# Patient Record
Sex: FEMALE | Race: WHITE | NOT HISPANIC OR LATINO | Employment: PART TIME | URBAN - METROPOLITAN AREA
[De-identification: names, ages, dates, MRNs, and addresses within clinical notes are randomized per-mention and may not be internally consistent; named-entity substitution may affect disease eponyms.]

---

## 2017-02-09 ENCOUNTER — APPOINTMENT (OUTPATIENT)
Dept: LAB | Facility: MEDICAL CENTER | Age: 22
End: 2017-02-09

## 2017-02-09 ENCOUNTER — TRANSCRIBE ORDERS (OUTPATIENT)
Dept: URGENT CARE | Facility: MEDICAL CENTER | Age: 22
End: 2017-02-09

## 2017-02-09 ENCOUNTER — APPOINTMENT (OUTPATIENT)
Dept: URGENT CARE | Facility: MEDICAL CENTER | Age: 22
End: 2017-02-09

## 2017-02-09 DIAGNOSIS — Z02.1 PHYSICAL EXAM, PRE-EMPLOYMENT: ICD-10-CM

## 2017-02-09 DIAGNOSIS — Z02.1 PHYSICAL EXAM, PRE-EMPLOYMENT: Primary | ICD-10-CM

## 2017-02-09 LAB — RUBV IGG SERPL IA-ACNC: >175 IU/ML

## 2017-02-09 PROCEDURE — 86765 RUBEOLA ANTIBODY: CPT

## 2017-02-09 PROCEDURE — 36415 COLL VENOUS BLD VENIPUNCTURE: CPT

## 2017-02-09 PROCEDURE — 86480 TB TEST CELL IMMUN MEASURE: CPT

## 2017-02-09 PROCEDURE — 86762 RUBELLA ANTIBODY: CPT

## 2017-02-09 PROCEDURE — 86787 VARICELLA-ZOSTER ANTIBODY: CPT

## 2017-02-09 PROCEDURE — 86735 MUMPS ANTIBODY: CPT

## 2017-02-11 LAB
ANNOTATION COMMENT IMP: NORMAL
GAMMA INTERFERON BACKGROUND BLD IA-ACNC: 0.05 IU/ML
M TB IFN-G BLD-IMP: NEGATIVE
M TB IFN-G CD4+ BCKGRND COR BLD-ACNC: <0.01 IU/ML
M TB IFN-G CD4+ T-CELLS BLD-ACNC: 0.04 IU/ML
MITOGEN IGNF BLD-ACNC: >10 IU/ML
QUANTIFERON-TB GOLD IN TUBE: NORMAL
SERVICE CMNT-IMP: NORMAL

## 2017-02-14 LAB
MEV IGG SER QL: ABNORMAL
MUV IGG SER QL: NORMAL
VZV IGG SER IA-ACNC: ABNORMAL

## 2018-02-14 ENCOUNTER — OFFICE VISIT (OUTPATIENT)
Dept: INTERNAL MEDICINE CLINIC | Facility: CLINIC | Age: 23
End: 2018-02-14
Payer: OTHER GOVERNMENT

## 2018-02-14 VITALS
WEIGHT: 127.8 LBS | DIASTOLIC BLOOD PRESSURE: 78 MMHG | HEIGHT: 64 IN | SYSTOLIC BLOOD PRESSURE: 120 MMHG | HEART RATE: 118 BPM | BODY MASS INDEX: 21.82 KG/M2

## 2018-02-14 DIAGNOSIS — G43.829 MENSTRUAL MIGRAINE WITHOUT STATUS MIGRAINOSUS, NOT INTRACTABLE: Primary | ICD-10-CM

## 2018-02-14 DIAGNOSIS — F41.9 ANXIETY: ICD-10-CM

## 2018-02-14 DIAGNOSIS — N94.6 DYSMENORRHEA: ICD-10-CM

## 2018-02-14 PROCEDURE — 99204 OFFICE O/P NEW MOD 45 MIN: CPT | Performed by: INTERNAL MEDICINE

## 2018-02-14 RX ORDER — LEVONORGESTREL AND ETHINYL ESTRADIOL 0.1-0.02MG
1 KIT ORAL DAILY
Refills: 3 | COMMUNITY
Start: 2017-11-15 | End: 2018-10-17 | Stop reason: SDUPTHER

## 2018-02-14 RX ORDER — NAPROXEN 500 MG/1
500 TABLET ORAL 2 TIMES DAILY WITH MEALS
Qty: 30 TABLET | Refills: 0 | Status: SHIPPED | OUTPATIENT
Start: 2018-02-14 | End: 2020-02-20 | Stop reason: ALTCHOICE

## 2018-02-14 RX ORDER — LEVONORGESTREL AND ETHINYL ESTRADIOL 0.1-0.02MG
1 KIT ORAL
COMMUNITY
Start: 2017-09-01 | End: 2018-02-14 | Stop reason: SDUPTHER

## 2018-02-14 NOTE — PROGRESS NOTES
Assessment/Plan:    No problem-specific Assessment & Plan notes found for this encounter  Diagnoses and all orders for this visit:    Menstrual migraine without status migrainosus, not intractable  -     CBC and differential; Future  -     TSH, 3rd generation with T4 reflex  -     Basic metabolic panel; Future  -     Lipid panel  -     naproxen (NAPROSYN) 500 mg tablet; Take 1 tablet (500 mg total) by mouth 2 (two) times a day with meals    Dysmenorrhea    Anxiety    Other orders  -     SRONYX 0 1-20 MG-MCG per tablet; Take 1 tablet by mouth daily  -     Discontinue: levonorgestrel-ethinyl estradiol (AVIANE,ALESSE,LESSINA) 0 1-20 MG-MCG per tablet; Take 1 tablet by mouth      Azar Vieira was seen and examined in the office today  She does get tearful on exam when discussing her   We discussed possibly seeing a therapist and fortunately does have a good support system with her mom  In terms of her migraines, I would continue with NSAID use or Excedrin  Hold off on daily preventative medication for now  Dysmenorrhea has improved  She was given blood work to complete  Subjective:      Patient ID: Kimberley Knutson is a 26 y o  female  Azar Vieira is a pleasant woman that is here today to establish care  Medical history was reviewed  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  Other history is significant for migraine headaches (see below) as well has dysmenorrhea which is much improved after being on birth control  In terms of her migraines, she reports episodes once a week and describes them left sided  Denies known triggers  She reports Excedrin does give relief  She reports increased stressors recently with her  being deployed 3 months ago  She does report a good support system with her mom  Migraine    This is a chronic problem  The current episode started more than 1 year ago  The problem occurs intermittently   The problem has been gradually worsening  The pain is located in the left unilateral and temporal region  The quality of the pain is described as sharp  Associated symptoms include photophobia  Pertinent negatives include no abdominal pain, blurred vision, coughing, dizziness, ear pain, eye pain, facial sweating, fever, nausea, phonophobia, seizures, sinus pressure, sore throat, swollen glands, visual change or vomiting  Nothing aggravates the symptoms  She has tried NSAIDs for the symptoms  Her past medical history is significant for migraine headaches and migraines in the family  There is no history of hypertension, sinus disease or TMJ  The following portions of the patient's history were reviewed and updated as appropriate: past medical history and problem list     Review of Systems   Constitutional: Negative for activity change, fatigue and fever  HENT: Negative for ear pain, sinus pressure and sore throat  Eyes: Positive for photophobia  Negative for blurred vision and pain  Respiratory: Negative for cough, chest tightness and wheezing  Hard to get a good breath of air- past 6 months  Some correlation with anxiety   Cardiovascular: Negative for chest pain, palpitations and leg swelling  Gastrointestinal: Negative for abdominal pain, constipation, diarrhea, nausea and vomiting  Genitourinary: Negative  Musculoskeletal: Negative for arthralgias  Skin: Negative  Neurological: Positive for headaches  Negative for dizziness, seizures and light-headedness  Psychiatric/Behavioral: Negative for dysphoric mood  The patient is nervous/anxious  Objective:    Vitals:    02/14/18 1054   BP: 120/78   Pulse: (!) 118        Physical Exam   Constitutional: She is oriented to person, place, and time  She appears well-developed and well-nourished  No distress  HENT:   Head: Normocephalic and atraumatic     Right Ear: External ear normal    Left Ear: External ear normal    Mouth/Throat: Oropharynx is clear and moist    Eyes: Conjunctivae are normal    Neck: Normal range of motion  Neck supple  No thyromegaly present  Cardiovascular: Normal rate, regular rhythm and normal heart sounds  Pulmonary/Chest: Effort normal and breath sounds normal  No respiratory distress  She has no wheezes  Abdominal: Soft  Bowel sounds are normal  There is no tenderness  Neurological: She is alert and oriented to person, place, and time  No cranial nerve deficit  Skin: Skin is warm and dry  She is not diaphoretic  Psychiatric: She has a normal mood and affect   Her speech is normal and behavior is normal  Judgment and thought content normal    Tearful at times

## 2018-10-17 DIAGNOSIS — Z00.00 HEALTH CARE MAINTENANCE: Primary | ICD-10-CM

## 2018-10-17 RX ORDER — LEVONORGESTREL AND ETHINYL ESTRADIOL 0.1-0.02MG
1 KIT ORAL DAILY
Qty: 28 TABLET | Refills: 0 | Status: SHIPPED | OUTPATIENT
Start: 2018-10-17 | End: 2018-11-07 | Stop reason: SDUPTHER

## 2018-11-07 DIAGNOSIS — Z00.00 HEALTH CARE MAINTENANCE: ICD-10-CM

## 2018-11-07 RX ORDER — LEVONORGESTREL AND ETHINYL ESTRADIOL 0.1-0.02MG
KIT ORAL
Qty: 28 TABLET | Refills: 0 | Status: SHIPPED | OUTPATIENT
Start: 2018-11-07 | End: 2018-12-12 | Stop reason: SDUPTHER

## 2018-12-12 DIAGNOSIS — Z00.00 HEALTH CARE MAINTENANCE: ICD-10-CM

## 2018-12-12 RX ORDER — LEVONORGESTREL AND ETHINYL ESTRADIOL 0.1-0.02MG
KIT ORAL
Qty: 28 TABLET | Refills: 0 | Status: SHIPPED | OUTPATIENT
Start: 2018-12-12 | End: 2019-01-06 | Stop reason: SDUPTHER

## 2019-01-06 DIAGNOSIS — Z00.00 HEALTH CARE MAINTENANCE: ICD-10-CM

## 2019-01-07 RX ORDER — LEVONORGESTREL AND ETHINYL ESTRADIOL 0.1-0.02MG
KIT ORAL
Qty: 28 TABLET | Refills: 0 | Status: SHIPPED | OUTPATIENT
Start: 2019-01-07 | End: 2019-01-29 | Stop reason: SDUPTHER

## 2019-01-29 DIAGNOSIS — Z00.00 HEALTH CARE MAINTENANCE: ICD-10-CM

## 2019-01-30 RX ORDER — LEVONORGESTREL AND ETHINYL ESTRADIOL 0.1-0.02MG
KIT ORAL
Qty: 28 TABLET | Refills: 2 | Status: SHIPPED | OUTPATIENT
Start: 2019-01-30 | End: 2019-04-26 | Stop reason: SDUPTHER

## 2019-04-26 DIAGNOSIS — Z00.00 HEALTH CARE MAINTENANCE: ICD-10-CM

## 2019-04-26 RX ORDER — LEVONORGESTREL AND ETHINYL ESTRADIOL 0.1-0.02MG
KIT ORAL
Qty: 28 TABLET | Refills: 0 | Status: SHIPPED | OUTPATIENT
Start: 2019-04-26 | End: 2019-05-26 | Stop reason: SDUPTHER

## 2019-05-26 DIAGNOSIS — Z00.00 HEALTH CARE MAINTENANCE: ICD-10-CM

## 2019-05-27 RX ORDER — LEVONORGESTREL AND ETHINYL ESTRADIOL 0.1-0.02MG
KIT ORAL
Qty: 28 TABLET | Refills: 0 | Status: SHIPPED | OUTPATIENT
Start: 2019-05-27 | End: 2019-06-21 | Stop reason: SDUPTHER

## 2019-06-21 DIAGNOSIS — Z00.00 HEALTH CARE MAINTENANCE: ICD-10-CM

## 2019-06-24 RX ORDER — LEVONORGESTREL AND ETHINYL ESTRADIOL 0.1-0.02MG
KIT ORAL
Qty: 28 TABLET | Refills: 0 | Status: SHIPPED | OUTPATIENT
Start: 2019-06-24 | End: 2019-07-22 | Stop reason: SDUPTHER

## 2019-07-22 DIAGNOSIS — Z00.00 HEALTH CARE MAINTENANCE: ICD-10-CM

## 2019-07-22 RX ORDER — LEVONORGESTREL AND ETHINYL ESTRADIOL 0.1-0.02MG
KIT ORAL
Qty: 28 TABLET | Refills: 0 | Status: SHIPPED | OUTPATIENT
Start: 2019-07-22 | End: 2019-08-19 | Stop reason: SDUPTHER

## 2019-08-19 DIAGNOSIS — Z00.00 HEALTH CARE MAINTENANCE: ICD-10-CM

## 2019-08-19 RX ORDER — LEVONORGESTREL AND ETHINYL ESTRADIOL 0.1-0.02MG
KIT ORAL
Qty: 28 TABLET | Refills: 0 | Status: SHIPPED | OUTPATIENT
Start: 2019-08-19 | End: 2020-02-20 | Stop reason: ALTCHOICE

## 2019-09-15 DIAGNOSIS — Z00.00 HEALTH CARE MAINTENANCE: ICD-10-CM

## 2019-09-16 RX ORDER — LEVONORGESTREL AND ETHINYL ESTRADIOL 0.1-0.02MG
KIT ORAL
Qty: 28 TABLET | Refills: 0 | OUTPATIENT
Start: 2019-09-16

## 2020-01-17 ENCOUNTER — APPOINTMENT (OUTPATIENT)
Dept: URGENT CARE | Facility: CLINIC | Age: 25
End: 2020-01-17

## 2020-01-17 ENCOUNTER — APPOINTMENT (OUTPATIENT)
Dept: LAB | Facility: CLINIC | Age: 25
End: 2020-01-17

## 2020-01-17 DIAGNOSIS — Z02.1 PRE-EMPLOYMENT HEALTH SCREENING EXAMINATION: ICD-10-CM

## 2020-01-17 DIAGNOSIS — Z02.1 PRE-EMPLOYMENT HEALTH SCREENING EXAMINATION: Primary | ICD-10-CM

## 2020-01-17 PROCEDURE — 36415 COLL VENOUS BLD VENIPUNCTURE: CPT

## 2020-01-17 PROCEDURE — 86480 TB TEST CELL IMMUN MEASURE: CPT

## 2020-01-20 LAB
GAMMA INTERFERON BACKGROUND BLD IA-ACNC: 0.08 IU/ML
M TB IFN-G BLD-IMP: NEGATIVE
M TB IFN-G CD4+ BCKGRND COR BLD-ACNC: -0.01 IU/ML
M TB IFN-G CD4+ BCKGRND COR BLD-ACNC: 0 IU/ML
MITOGEN IGNF BCKGRD COR BLD-ACNC: 7.88 IU/ML

## 2020-02-13 ENCOUNTER — TELEPHONE (OUTPATIENT)
Dept: OBGYN CLINIC | Facility: CLINIC | Age: 25
End: 2020-02-13

## 2020-02-13 DIAGNOSIS — Z34.92 ENCOUNTER FOR PREGNANCY RELATED EXAMINATION, SECOND TRIMESTER: Primary | ICD-10-CM

## 2020-02-13 NOTE — TELEPHONE ENCOUNTER
This patient called the [facility] office to make an appointment for an OB Ed today  She is currently about 15 weeks pregnant and has no had any care  She stated that she did have an unofficial ultrasound to know about how far along she is and she is taking prenatals at this time  She just recently moved back from [place]  Would you be able to call her to set up her appt

## 2020-02-13 NOTE — TELEPHONE ENCOUNTER
LMP=beginning of November  No formal dating US completed  Advised will need dating US at [facility]-referral for same placed    Will call to schedule OB ED visit after US

## 2020-02-20 ENCOUNTER — ULTRASOUND (OUTPATIENT)
Dept: PERINATAL CARE | Facility: OTHER | Age: 25
End: 2020-02-20
Payer: COMMERCIAL

## 2020-02-20 ENCOUNTER — TELEPHONE (OUTPATIENT)
Dept: PERINATAL CARE | Facility: OTHER | Age: 25
End: 2020-02-20

## 2020-02-20 VITALS
SYSTOLIC BLOOD PRESSURE: 124 MMHG | HEIGHT: 64 IN | DIASTOLIC BLOOD PRESSURE: 83 MMHG | HEART RATE: 88 BPM | BODY MASS INDEX: 24.31 KG/M2 | WEIGHT: 142.42 LBS

## 2020-02-20 DIAGNOSIS — Z3A.16 16 WEEKS GESTATION OF PREGNANCY: ICD-10-CM

## 2020-02-20 DIAGNOSIS — Z34.92 ENCOUNTER FOR PREGNANCY RELATED EXAMINATION, SECOND TRIMESTER: Primary | ICD-10-CM

## 2020-02-20 PROCEDURE — 76805 OB US >/= 14 WKS SNGL FETUS: CPT | Performed by: OBSTETRICS & GYNECOLOGY

## 2020-02-21 NOTE — PROGRESS NOTES
Please refer to the [facility] ultrasound report in Ob Procedures for additional information regarding the visit to the [address] [place] [address]    Maria Fernanda Uribe MD

## 2020-02-24 ENCOUNTER — INITIAL PRENATAL (OUTPATIENT)
Dept: OBGYN CLINIC | Facility: MEDICAL CENTER | Age: 25
End: 2020-02-24

## 2020-02-24 ENCOUNTER — APPOINTMENT (OUTPATIENT)
Dept: LAB | Facility: MEDICAL CENTER | Age: 25
End: 2020-02-24
Payer: COMMERCIAL

## 2020-02-24 DIAGNOSIS — Z34.92 ENCOUNTER FOR PREGNANCY RELATED EXAMINATION IN SECOND TRIMESTER: ICD-10-CM

## 2020-02-24 DIAGNOSIS — Z34.92 ENCOUNTER FOR PREGNANCY RELATED EXAMINATION IN SECOND TRIMESTER: Primary | ICD-10-CM

## 2020-02-24 LAB
ABO GROUP BLD: NORMAL
AMORPH PHOS CRY URNS QL MICRO: NORMAL /HPF
BACTERIA UR QL AUTO: NORMAL /HPF
BASOPHILS # BLD AUTO: 0.03 THOUSANDS/ΜL (ref 0–0.1)
BASOPHILS NFR BLD AUTO: 0 % (ref 0–1)
BILIRUB UR QL STRIP: NEGATIVE
BLD GP AB SCN SERPL QL: NEGATIVE
CLARITY UR: ABNORMAL
COLOR UR: YELLOW
EOSINOPHIL # BLD AUTO: 0.14 THOUSAND/ΜL (ref 0–0.61)
EOSINOPHIL NFR BLD AUTO: 1 % (ref 0–6)
ERYTHROCYTE [DISTWIDTH] IN BLOOD BY AUTOMATED COUNT: 12.8 % (ref 11.6–15.1)
EXTERNAL GROUP B STREP ANTIGEN: POSITIVE
GLUCOSE UR STRIP-MCNC: NEGATIVE MG/DL
HBV SURFACE AG SER QL: NORMAL
HCT VFR BLD AUTO: 36.7 % (ref 34.8–46.1)
HGB BLD-MCNC: 12.5 G/DL (ref 11.5–15.4)
HGB UR QL STRIP.AUTO: NEGATIVE
IMM GRANULOCYTES # BLD AUTO: 0.03 THOUSAND/UL (ref 0–0.2)
IMM GRANULOCYTES NFR BLD AUTO: 0 % (ref 0–2)
KETONES UR STRIP-MCNC: NEGATIVE MG/DL
LEUKOCYTE ESTERASE UR QL STRIP: ABNORMAL
LYMPHOCYTES # BLD AUTO: 1.79 THOUSANDS/ΜL (ref 0.6–4.47)
LYMPHOCYTES NFR BLD AUTO: 18 % (ref 14–44)
MCH RBC QN AUTO: 31 PG (ref 26.8–34.3)
MCHC RBC AUTO-ENTMCNC: 34.1 G/DL (ref 31.4–37.4)
MCV RBC AUTO: 91 FL (ref 82–98)
MONOCYTES # BLD AUTO: 0.54 THOUSAND/ΜL (ref 0.17–1.22)
MONOCYTES NFR BLD AUTO: 6 % (ref 4–12)
NEUTROPHILS # BLD AUTO: 7.22 THOUSANDS/ΜL (ref 1.85–7.62)
NEUTS SEG NFR BLD AUTO: 75 % (ref 43–75)
NITRITE UR QL STRIP: NEGATIVE
NON-SQ EPI CELLS URNS QL MICRO: NORMAL /HPF
NRBC BLD AUTO-RTO: 0 /100 WBCS
PH UR STRIP.AUTO: 7 [PH]
PLATELET # BLD AUTO: 281 THOUSANDS/UL (ref 149–390)
PMV BLD AUTO: 10 FL (ref 8.9–12.7)
PROT UR STRIP-MCNC: NEGATIVE MG/DL
RBC # BLD AUTO: 4.03 MILLION/UL (ref 3.81–5.12)
RBC #/AREA URNS AUTO: NORMAL /HPF
RH BLD: POSITIVE
RPR SER QL: NORMAL
RUBV IGG SERPL IA-ACNC: >175 IU/ML
SP GR UR STRIP.AUTO: 1.02 (ref 1–1.03)
SPECIMEN EXPIRATION DATE: NORMAL
UROBILINOGEN UR QL STRIP.AUTO: 0.2 E.U./DL
WBC # BLD AUTO: 9.75 THOUSAND/UL (ref 4.31–10.16)
WBC #/AREA URNS AUTO: NORMAL /HPF

## 2020-02-24 PROCEDURE — 87147 CULTURE TYPE IMMUNOLOGIC: CPT

## 2020-02-24 PROCEDURE — 84702 CHORIONIC GONADOTROPIN TEST: CPT

## 2020-02-24 PROCEDURE — 82677 ASSAY OF ESTRIOL: CPT

## 2020-02-24 PROCEDURE — 82105 ALPHA-FETOPROTEIN SERUM: CPT

## 2020-02-24 PROCEDURE — 36415 COLL VENOUS BLD VENIPUNCTURE: CPT

## 2020-02-24 PROCEDURE — 80081 OBSTETRIC PANEL INC HIV TSTG: CPT

## 2020-02-24 PROCEDURE — 86336 INHIBIN A: CPT

## 2020-02-24 PROCEDURE — 87086 URINE CULTURE/COLONY COUNT: CPT

## 2020-02-24 PROCEDURE — 81001 URINALYSIS AUTO W/SCOPE: CPT

## 2020-02-24 PROCEDURE — NOBC: Performed by: OBSTETRICS & GYNECOLOGY

## 2020-02-24 NOTE — PROGRESS NOTES
OB INTAKE INTERVIEW      Pt presents for OB intake    OB History    Para Term  AB Living   1 0           SAB TAB Ectopic Multiple Live Births                  # Outcome Date GA Lbr Eron/2nd Weight Sex Delivery Anes PTL Lv   1 Current                  Hx of  delivery prior to 36 weeks 6 days:  no     Last Menstrual Period:   No LMP recorded (lmp unknown)  Patient is pregnant  Estimated date of delivery:   Estimated Date of Delivery: 8/3/20  confirmed by [address]  ? History of Diabetes: no  History of Hypertension:no      Infection Screening: Does the pt have a hx of MRSA?no    H&P visit scheduled   2020    ? Interview education  Information on [facility] Pregnancy Essentials reviewed  Handouts given: Baby and Me phone nissa guide  Baby and Me support center  [facility] Boby Jalloh in Pregnancy information sheet   [facility]  Discussed genetic testing-     Desires QUAD screen  Information on CF and SMA carrier screening given-will let office know at next appointment if testing desired               Discussed Tdap vaccine  Influenza vaccine given for current season         Depression Screening Follow-up Plan: Patient's depression screening was NEGATIVE with an [place] score of  6    Initial PN panel and QUAD screen drawn at out-patient today                The patient was oriented to our practice and all questions were answered    Interviewed by: Harman Monreal RN 20

## 2020-02-24 NOTE — PATIENT INSTRUCTIONS
Pregnancy at 15 to 18 Weeks   AMBULATORY CARE:   What changes are happening to your body:  Now that you are in your second trimester, you have more energy  You may also feel hungrier than usual  You may start to experience other symptoms, such as heartburn or dizziness  You may be gaining about ½ to 1 pound a week, and your pregnancy is beginning to show  You may need to start wearing maternity clothes  Seek care immediately if:   · You have pain or cramping in your abdomen or low back  · You have heavy vaginal bleeding or clotting  · You pass material that looks like tissue or large clots  Collect the material and bring it with you  Contact your healthcare provider if:   · You cannot keep food or drinks down, and you are losing weight  · You have light bleeding  · You have chills or a fever  · You have vaginal itching, burning, or pain  · You have yellow, green, white, or foul-smelling vaginal discharge  · You have pain or burning when you urinate, less urine than usual, or pink or bloody urine  · You have questions or concerns about your condition or care  How to care for yourself at this stage of your pregnancy:   · Manage heartburn  by eating 4 or 5 small meals each day instead of large meals  Avoid spicy foods  Avoid eating right before bedtime  · Manage nausea and vomiting  Avoid fatty and spicy foods  Eat small meals throughout the day instead of large meals  Tammy may help to decrease nausea  Ask your healthcare provider about other ways of decreasing nausea and vomiting  · Eat a variety of healthy foods  Healthy foods include fruits, vegetables, whole-grain breads, low-fat dairy foods, beans, lean meats, and fish  Drink liquids as directed  Ask how much liquid to drink each day and which liquids are best for you  Limit caffeine to less than 200 milligrams each day  Limit your intake of fish to 2 servings each week   Choose fish low in mercury such as canned light tuna, shrimp, salmon, cod, or tilapia  Do not  eat fish high in mercury such as swordfish, tilefish, benjamin mackerel, and shark  · Take prenatal vitamins as directed  Your need for certain vitamins and minerals, such as folic acid, increases during pregnancy  Prenatal vitamins provide some of the extra vitamins and minerals you need  Prenatal vitamins may also help to decrease the risk of certain birth defects  · Do not smoke  If you smoke, it is never too late to quit  Smoking increases your risk of a miscarriage and other health problems during your pregnancy  Smoking can cause your baby to be born too early or weigh less at birth  Ask your healthcare provider for information if you need help quitting  · Do not drink alcohol  Alcohol passes from your body to your baby through the placenta  It can affect your baby's brain development and cause fetal alcohol syndrome (FAS)  FAS is a group of conditions that causes mental, behavior, and growth problems  · Talk to your healthcare provider before you take any medicines  Many medicines may harm your baby if you take them when you are pregnant  Do not take any medicines, vitamins, herbs, or supplements without first talking to your healthcare provider  Never use illegal or street drugs (such as marijuana or cocaine) while you are pregnant  Safety tips during pregnancy:   · Avoid hot tubs and saunas  Do not use a hot tub or sauna while you are pregnant, especially during your first trimester  Hot tubs and saunas may raise your baby's temperature and increase the risk of birth defects  · Avoid toxoplasmosis  This is an infection caused by eating raw meat or being around infected cat feces  It can cause birth defects, miscarriages, and other problems  Wash your hands after you touch raw meat  Make sure any meat is well-cooked before you eat it  Avoid raw eggs and unpasteurized milk   Use gloves or ask someone else to clean your cat's litter box while you are pregnant  Changes that are happening with your baby:  By 18 weeks, your baby may be about 6 inches long from the top of the head to the rump (baby's bottom)  Your baby may weigh about 11 ounces  You may be able to feel your baby's movement at about 18 weeks or later  The first movements may not be that noticeable  They may feel like a fluttering sensation  Your baby also makes sucking movements and can hear certain sounds  What you need to know about prenatal care:  During the first 28 weeks of your pregnancy, you will see your healthcare provider once a month  Your healthcare provider will check your blood pressure and weight  You may also need any of the following:  · A urine test  may also be done to check for sugar and protein  These can be signs of gestational diabetes or infection  · A blood test  may be done to check for anemia (low iron level)  · Fundal height check  is a measurement of your uterus to check your baby's growth  This number is usually the same as the number of weeks that you have been pregnant  · An ultrasound  may be done to check your baby's development  Your healthcare provider may be able to tell you what your baby's gender is during the ultrasound  · Your baby's heart rate  will be checked  © 2017 [address] [employer] Information is for End User's use only and may not be sold, redistributed or otherwise used for commercial purposes  All illustrations and images included in CareNotes® are the copyrighted property of [employer] , [employer]  or Gama Thomas  The above information is an  only  It is not intended as medical advice for individual conditions or treatments  Talk to your doctor, nurse or pharmacist before following any medical regimen to see if it is safe and effective for you

## 2020-02-26 DIAGNOSIS — A49.1 GROUP B STREPTOCOCCAL INFECTION: Primary | ICD-10-CM

## 2020-02-26 LAB
BACTERIA UR CULT: ABNORMAL
HIV 1+2 AB+HIV1 P24 AG SERPL QL IA: NORMAL

## 2020-02-27 ENCOUNTER — TELEPHONE (OUTPATIENT)
Dept: OBGYN CLINIC | Facility: MEDICAL CENTER | Age: 25
End: 2020-02-27

## 2020-02-27 LAB
2ND TRIMESTER 4 SCREEN SERPL-IMP: NORMAL
2ND TRIMESTER 4 SCREEN SERPL-IMP: NORMAL
AFP ADJ MOM SERPL: 0.61
AFP SERPL-MCNC: 24.1 NG/ML
AGE AT DELIVERY: 23.5 YR
FET TS 18 RISK FROM MAT AGE: NORMAL
FET TS 21 RISK FROM MAT AGE: 1088
GA METHOD: NORMAL
GA: 17 WEEKS
HCG ADJ MOM SERPL: 0.96
HCG SERPL-ACNC: NORMAL MIU/ML
IDDM PATIENT QL: NO
INHIBIN A ADJ MOM SERPL: 0.84
INHIBIN A SERPL-MCNC: 142.01 PG/ML
KARYOTYP BLD/T: NORMAL
MULTIPLE PREGNANCY: NO
NEURAL TUBE DEFECT RISK FETUS: NORMAL %
SERVICE CMNT-IMP: NORMAL
TS 18 RISK FETUS: NORMAL
TS 21 RISK FETUS: 4512
U ESTRIOL ADJ MOM SERPL: 0.92
U ESTRIOL SERPL-MCNC: 1.02 NG/ML

## 2020-02-27 RX ORDER — CEPHALEXIN 500 MG/1
500 CAPSULE ORAL EVERY 12 HOURS SCHEDULED
Qty: 14 CAPSULE | Refills: 0 | Status: SHIPPED | OUTPATIENT
Start: 2020-02-27 | End: 2020-03-05

## 2020-03-02 ENCOUNTER — INITIAL PRENATAL (OUTPATIENT)
Dept: OBGYN CLINIC | Facility: CLINIC | Age: 25
End: 2020-03-02

## 2020-03-02 VITALS — SYSTOLIC BLOOD PRESSURE: 120 MMHG | WEIGHT: 145 LBS | BODY MASS INDEX: 24.89 KG/M2 | DIASTOLIC BLOOD PRESSURE: 70 MMHG

## 2020-03-02 DIAGNOSIS — Z34.92 SECOND TRIMESTER PREGNANCY: ICD-10-CM

## 2020-03-02 DIAGNOSIS — Z3A.18 18 WEEKS GESTATION OF PREGNANCY: Primary | ICD-10-CM

## 2020-03-02 DIAGNOSIS — O99.820 GROUP B STREPTOCOCCUS CARRIER STATE AFFECTING PREGNANCY: ICD-10-CM

## 2020-03-02 PROCEDURE — 87491 CHLMYD TRACH DNA AMP PROBE: CPT | Performed by: OBSTETRICS & GYNECOLOGY

## 2020-03-02 PROCEDURE — PNV: Performed by: OBSTETRICS & GYNECOLOGY

## 2020-03-02 PROCEDURE — 87591 N.GONORRHOEAE DNA AMP PROB: CPT | Performed by: OBSTETRICS & GYNECOLOGY

## 2020-03-02 NOTE — PROGRESS NOTES
Assessment:  23 y o   who is POD#*** from ***  She is ***doing well post-op  Plan:  {Assess/PlanSmartLinks:57507}        __________________________________________________________________    Subjective   Jc Henriquez is a 23 y o   who presents POD#*** from *** for ***  Patient reports ***  She notes her pain is ***well-controlled  She is using *** to control her pain  Her incision is healing well; she ***denies redness or drainage  Her bowel function is ***normal and she is ***having regular BM's  She has *** returned to most of her normal activities  She has ***not yet returned to sexual activity  ***c/s only  Lochia is ***  She reports that she is ***doing well emotionally and ***denies depressive sx  She is ***breastfeeding and reports it is ***going well       {Common ambulatory SmartLinks:33279}    Review of Systems  {ros; complete:69053}       Objective  /70   Wt 65 8 kg (145 lb)   LMP  (LMP Unknown)   BMI 24 89 kg/m²      Physical Exam:  Physical Exam      Lab Review  Labs: {abd pain lab:89442}     Imaging  {abd pain image:57686}

## 2020-03-02 NOTE — PROGRESS NOTES
Assessment  23 y o   at 18w0d presenting for initial prenatal visit  Plan  Diagnoses and all orders for this visit:    18 weeks gestation of pregnancy  Second trimester pregnancy  - Discussed delivering providers, anticipated PN course/visit schedule  - Reviewed results of Quad screen  - Declines CF/SMA testing  - Needs to schedule level 2  Discussed due in approx 2wks  - Reviewed early pregnancy precautions  - GC collected  - Return in 4wks for prenatal     Group B Streptococcus carrier state affecting pregnancy  - Currently on treatment for bacteruria  - Will need suppression in labor      ____________________________________________________________        Subjective    Ervin Schrader is a 23 y o   at 18w0d who presents for routine prenatal visit  She is without complaint  Patient notes that this pregnancy was unplanned  She was not using contraception at the time, was planning to start but missed 2mo in this time  LMP maybe sometime in Nov  She has has no vaginal bleeding since her LMP  Patient's PMH is uncomplicated  This is her first pregnancy  She denies a hx of STD/STI, denies a hx of TB or close contacts with persons with TB  She denies a family history of inheritable conditions such as physical or intellectual disabilities, birth defects, blood disorders, heart or neural tube defects  She has never had MRSA  She denies recent travel or travel planned in the near future  Patient works at [employer] with [employer]  Pregnancy Problems:  Patient Active Problem List   Diagnosis    Dysmenorrhea    Migraine    Anxiety       Objective  /70   Wt 65 8 kg (145 lb)   LMP  (LMP Unknown)   BMI 24 89 kg/m²     FHT: 146 BPM     Physical Exam:  Physical Exam   Constitutional: She appears well-developed and well-nourished  No distress  HENT:   Head: Normocephalic and atraumatic  Eyes: No scleral icterus  Pulmonary/Chest: Effort normal  No accessory muscle usage   No respiratory distress  Abdominal: She exhibits distension (gravid)  There is no tenderness  There is no rebound and no guarding  Skin: Skin is warm and dry  No rash noted  No erythema  Psychiatric: She has a normal mood and affect  Her behavior is normal        Prenatal Panel:  Completed and reviewed     Quad reviewed - negative

## 2020-03-04 ENCOUNTER — TELEPHONE (OUTPATIENT)
Dept: OBGYN CLINIC | Facility: MEDICAL CENTER | Age: 25
End: 2020-03-04

## 2020-03-04 LAB
C TRACH DNA SPEC QL NAA+PROBE: NEGATIVE
N GONORRHOEA DNA SPEC QL NAA+PROBE: NEGATIVE

## 2020-03-04 NOTE — TELEPHONE ENCOUNTER
Per [facility] is required  Proof of pregnancy faxed today to [facility]   Routine prenatal visits will be covered at 100%, no deductible  All inpatient services and delivery will be subject to deductible, then covered at 100%

## 2020-03-16 ENCOUNTER — TELEPHONE (OUTPATIENT)
Dept: PERINATAL CARE | Facility: CLINIC | Age: 25
End: 2020-03-16

## 2020-03-18 ENCOUNTER — TELEPHONE (OUTPATIENT)
Dept: PERINATAL CARE | Facility: CLINIC | Age: 25
End: 2020-03-18

## 2020-03-18 NOTE — TELEPHONE ENCOUNTER
I called AND LEFT VOICEMAIL    Updated pt for MFM appt, asked screening questions & informed pt no companions allowed for appt

## 2020-03-19 ENCOUNTER — ROUTINE PRENATAL (OUTPATIENT)
Dept: PERINATAL CARE | Facility: OTHER | Age: 25
End: 2020-03-19
Payer: COMMERCIAL

## 2020-03-19 VITALS — BODY MASS INDEX: 25.33 KG/M2 | WEIGHT: 148.4 LBS | HEIGHT: 64 IN

## 2020-03-19 DIAGNOSIS — Z36.86 ENCOUNTER FOR ANTENATAL SCREENING FOR CERVICAL LENGTH: ICD-10-CM

## 2020-03-19 DIAGNOSIS — Z36.3 ENCOUNTER FOR ANTENATAL SCREENING FOR MALFORMATION: Primary | ICD-10-CM

## 2020-03-19 DIAGNOSIS — Z3A.20 20 WEEKS GESTATION OF PREGNANCY: ICD-10-CM

## 2020-03-19 PROCEDURE — 76817 TRANSVAGINAL US OBSTETRIC: CPT | Performed by: OBSTETRICS & GYNECOLOGY

## 2020-03-19 PROCEDURE — 76805 OB US >/= 14 WKS SNGL FETUS: CPT | Performed by: OBSTETRICS & GYNECOLOGY

## 2020-03-19 NOTE — PROGRESS NOTES
Pt does not need b/p checked per new office policy by Dr Dante De Anda  due to COVID-19  Pt is in 1st or 2nd trimester guidelines  Only weight is needed

## 2020-03-19 NOTE — PROGRESS NOTES
Patient presents for her anatomy scan  No malformations were noted  Use of the fetal face are limited secondary to fetal position  Lips palate and profile appear normal   Her sequential screen was normal     Recommend a 32 week ultrasound for growth and review of the fetal face      Ivan Driscoll MD

## 2020-03-19 NOTE — LETTER
March 19, 2020     Remi Doshi MD  [address]    Patient: Jessie Nath   YOB: 1995   Date of Visit: 3/19/2020       Dear Dr Cruz Azevedo:    Thank you for referring Kitty Quevedo to me for evaluation  Below are my notes for this consultation  If you have questions, please do not hesitate to call me  I look forward to following your patient along with you  Sincerely,        Mirtha Rivera MD        CC: No Recipients  Mirtha Rivera MD  3/19/2020  1:01 PM  Sign at close encounter  Patient presents for her anatomy scan  No malformations were noted  Use of the fetal face are limited secondary to fetal position  Lips palate and profile appear normal   Her sequential screen was normal     Recommend a 32 week ultrasound for growth and review of the fetal face      Mirtha Rivera MD

## 2020-03-19 NOTE — PROGRESS NOTES
A transvaginal ultrasound was performed  Sonographer note on use of High Level Disinfection Process (Trophon) for transvaginal probe#3  used, serial [id number]    Fabiola Wilson

## 2020-04-02 ENCOUNTER — TELEMEDICINE (OUTPATIENT)
Dept: OBGYN CLINIC | Facility: MEDICAL CENTER | Age: 25
End: 2020-04-02
Payer: COMMERCIAL

## 2020-04-02 DIAGNOSIS — Z34.92 SECOND TRIMESTER PREGNANCY: ICD-10-CM

## 2020-04-02 DIAGNOSIS — Z3A.22 22 WEEKS GESTATION OF PREGNANCY: ICD-10-CM

## 2020-04-02 DIAGNOSIS — R82.71 BACTERIA IN URINE: Primary | ICD-10-CM

## 2020-04-02 PROCEDURE — 99213 OFFICE O/P EST LOW 20 MIN: CPT | Performed by: NURSE PRACTITIONER

## 2020-05-01 ENCOUNTER — TELEMEDICINE (OUTPATIENT)
Dept: OBGYN CLINIC | Facility: MEDICAL CENTER | Age: 25
End: 2020-05-01

## 2020-05-01 DIAGNOSIS — Z34.92 SECOND TRIMESTER PREGNANCY: ICD-10-CM

## 2020-05-01 DIAGNOSIS — R82.71 GBS BACTERIURIA: ICD-10-CM

## 2020-05-01 DIAGNOSIS — Z3A.26 26 WEEKS GESTATION OF PREGNANCY: Primary | ICD-10-CM

## 2020-05-01 PROCEDURE — PNV: Performed by: OBSTETRICS & GYNECOLOGY

## 2020-05-15 ENCOUNTER — ROUTINE PRENATAL (OUTPATIENT)
Dept: OBGYN CLINIC | Facility: MEDICAL CENTER | Age: 25
End: 2020-05-15
Payer: COMMERCIAL

## 2020-05-15 VITALS — BODY MASS INDEX: 26.35 KG/M2 | SYSTOLIC BLOOD PRESSURE: 110 MMHG | WEIGHT: 153.5 LBS | DIASTOLIC BLOOD PRESSURE: 60 MMHG

## 2020-05-15 DIAGNOSIS — Z34.93 THIRD TRIMESTER PREGNANCY: Primary | ICD-10-CM

## 2020-05-15 DIAGNOSIS — Z3A.28 28 WEEKS GESTATION OF PREGNANCY: ICD-10-CM

## 2020-05-15 LAB
BASOPHILS # BLD AUTO: 0.03 THOUSANDS/ΜL (ref 0–0.1)
BASOPHILS NFR BLD AUTO: 0 % (ref 0–1)
EOSINOPHIL # BLD AUTO: 0.07 THOUSAND/ΜL (ref 0–0.61)
EOSINOPHIL NFR BLD AUTO: 1 % (ref 0–6)
ERYTHROCYTE [DISTWIDTH] IN BLOOD BY AUTOMATED COUNT: 11.9 % (ref 11.6–15.1)
GLUCOSE 1H P 50 G GLC PO SERPL-MCNC: 127 MG/DL
HCT VFR BLD AUTO: 34.1 % (ref 34.8–46.1)
HGB BLD-MCNC: 11.4 G/DL (ref 11.5–15.4)
IMM GRANULOCYTES # BLD AUTO: 0.08 THOUSAND/UL (ref 0–0.2)
IMM GRANULOCYTES NFR BLD AUTO: 1 % (ref 0–2)
LYMPHOCYTES # BLD AUTO: 1.69 THOUSANDS/ΜL (ref 0.6–4.47)
LYMPHOCYTES NFR BLD AUTO: 15 % (ref 14–44)
MCH RBC QN AUTO: 30.9 PG (ref 26.8–34.3)
MCHC RBC AUTO-ENTMCNC: 33.4 G/DL (ref 31.4–37.4)
MCV RBC AUTO: 92 FL (ref 82–98)
MONOCYTES # BLD AUTO: 0.59 THOUSAND/ΜL (ref 0.17–1.22)
MONOCYTES NFR BLD AUTO: 5 % (ref 4–12)
NEUTROPHILS # BLD AUTO: 9.09 THOUSANDS/ΜL (ref 1.85–7.62)
NEUTS SEG NFR BLD AUTO: 78 % (ref 43–75)
NRBC BLD AUTO-RTO: 0 /100 WBCS
PLATELET # BLD AUTO: 242 THOUSANDS/UL (ref 149–390)
PMV BLD AUTO: 10.6 FL (ref 8.9–12.7)
RBC # BLD AUTO: 3.69 MILLION/UL (ref 3.81–5.12)
WBC # BLD AUTO: 11.55 THOUSAND/UL (ref 4.31–10.16)

## 2020-05-15 PROCEDURE — 87147 CULTURE TYPE IMMUNOLOGIC: CPT | Performed by: OBSTETRICS & GYNECOLOGY

## 2020-05-15 PROCEDURE — PNV: Performed by: OBSTETRICS & GYNECOLOGY

## 2020-05-15 PROCEDURE — 82950 GLUCOSE TEST: CPT | Performed by: OBSTETRICS & GYNECOLOGY

## 2020-05-15 PROCEDURE — 85025 COMPLETE CBC W/AUTO DIFF WBC: CPT | Performed by: OBSTETRICS & GYNECOLOGY

## 2020-05-15 PROCEDURE — 87086 URINE CULTURE/COLONY COUNT: CPT | Performed by: OBSTETRICS & GYNECOLOGY

## 2020-05-15 PROCEDURE — 36415 COLL VENOUS BLD VENIPUNCTURE: CPT | Performed by: OBSTETRICS & GYNECOLOGY

## 2020-05-16 LAB — BACTERIA UR CULT: ABNORMAL

## 2020-05-18 DIAGNOSIS — R82.71 GROUP B STREPTOCOCCAL BACTERIURIA: Primary | ICD-10-CM

## 2020-05-18 RX ORDER — NITROFURANTOIN 25; 75 MG/1; MG/1
100 CAPSULE ORAL 2 TIMES DAILY
Qty: 14 CAPSULE | Refills: 0 | Status: SHIPPED | OUTPATIENT
Start: 2020-05-18 | End: 2020-05-25

## 2020-05-20 ENCOUNTER — TELEPHONE (OUTPATIENT)
Dept: PERINATAL CARE | Facility: CLINIC | Age: 25
End: 2020-05-20

## 2020-05-27 ENCOUNTER — ROUTINE PRENATAL (OUTPATIENT)
Dept: OBGYN CLINIC | Facility: MEDICAL CENTER | Age: 25
End: 2020-05-27
Payer: COMMERCIAL

## 2020-05-27 VITALS
BODY MASS INDEX: 26.66 KG/M2 | SYSTOLIC BLOOD PRESSURE: 110 MMHG | TEMPERATURE: 98.2 F | DIASTOLIC BLOOD PRESSURE: 80 MMHG | WEIGHT: 155.3 LBS

## 2020-05-27 DIAGNOSIS — Z3A.30 30 WEEKS GESTATION OF PREGNANCY: ICD-10-CM

## 2020-05-27 DIAGNOSIS — Z34.93 THIRD TRIMESTER PREGNANCY: Primary | ICD-10-CM

## 2020-05-27 DIAGNOSIS — R82.71 GROUP B STREPTOCOCCAL BACTERIURIA: ICD-10-CM

## 2020-05-27 DIAGNOSIS — Z23 NEED FOR TDAP VACCINATION: ICD-10-CM

## 2020-05-27 PROCEDURE — 87086 URINE CULTURE/COLONY COUNT: CPT | Performed by: OBSTETRICS & GYNECOLOGY

## 2020-05-27 PROCEDURE — 90471 IMMUNIZATION ADMIN: CPT

## 2020-05-27 PROCEDURE — PNV: Performed by: OBSTETRICS & GYNECOLOGY

## 2020-05-27 PROCEDURE — 90715 TDAP VACCINE 7 YRS/> IM: CPT

## 2020-05-28 LAB — BACTERIA UR CULT: NORMAL

## 2020-06-09 ENCOUNTER — ROUTINE PRENATAL (OUTPATIENT)
Dept: OBGYN CLINIC | Facility: MEDICAL CENTER | Age: 25
End: 2020-06-09

## 2020-06-09 VITALS — DIASTOLIC BLOOD PRESSURE: 74 MMHG | BODY MASS INDEX: 27.81 KG/M2 | SYSTOLIC BLOOD PRESSURE: 110 MMHG | WEIGHT: 162 LBS

## 2020-06-09 DIAGNOSIS — Z34.93 THIRD TRIMESTER PREGNANCY: ICD-10-CM

## 2020-06-09 DIAGNOSIS — Z3A.32 32 WEEKS GESTATION OF PREGNANCY: Primary | ICD-10-CM

## 2020-06-09 PROBLEM — Z22.330 GBS CARRIER: Status: ACTIVE | Noted: 2020-06-09

## 2020-06-09 PROCEDURE — PNV: Performed by: OBSTETRICS & GYNECOLOGY

## 2020-06-10 ENCOUNTER — TELEPHONE (OUTPATIENT)
Dept: PERINATAL CARE | Facility: CLINIC | Age: 25
End: 2020-06-10

## 2020-06-11 ENCOUNTER — ULTRASOUND (OUTPATIENT)
Dept: PERINATAL CARE | Facility: OTHER | Age: 25
End: 2020-06-11
Payer: COMMERCIAL

## 2020-06-11 VITALS
HEIGHT: 64 IN | TEMPERATURE: 98.2 F | BODY MASS INDEX: 26.67 KG/M2 | DIASTOLIC BLOOD PRESSURE: 75 MMHG | SYSTOLIC BLOOD PRESSURE: 113 MMHG | HEART RATE: 87 BPM | WEIGHT: 156.2 LBS

## 2020-06-11 DIAGNOSIS — Z36.3 ENCOUNTER FOR ANTENATAL SCREENING FOR MALFORMATION: Primary | ICD-10-CM

## 2020-06-11 DIAGNOSIS — Z3A.32 32 WEEKS GESTATION OF PREGNANCY: ICD-10-CM

## 2020-06-11 PROCEDURE — 76816 OB US FOLLOW-UP PER FETUS: CPT | Performed by: OBSTETRICS & GYNECOLOGY

## 2020-06-13 ENCOUNTER — OFFICE VISIT (OUTPATIENT)
Dept: URGENT CARE | Facility: MEDICAL CENTER | Age: 25
End: 2020-06-13
Payer: COMMERCIAL

## 2020-06-13 VITALS
RESPIRATION RATE: 18 BRPM | BODY MASS INDEX: 26.8 KG/M2 | TEMPERATURE: 97.7 F | SYSTOLIC BLOOD PRESSURE: 124 MMHG | HEIGHT: 64 IN | HEART RATE: 98 BPM | DIASTOLIC BLOOD PRESSURE: 71 MMHG | OXYGEN SATURATION: 97 % | WEIGHT: 157 LBS

## 2020-06-13 DIAGNOSIS — R39.9 UTI SYMPTOMS: Primary | ICD-10-CM

## 2020-06-13 LAB
SL AMB  POCT GLUCOSE, UA: ABNORMAL
SL AMB LEUKOCYTE ESTERASE,UA: ABNORMAL
SL AMB POCT BILIRUBIN,UA: ABNORMAL
SL AMB POCT BLOOD,UA: ABNORMAL
SL AMB POCT CLARITY,UA: CLEAR
SL AMB POCT COLOR,UA: YELLOW
SL AMB POCT KETONES,UA: ABNORMAL
SL AMB POCT NITRITE,UA: ABNORMAL
SL AMB POCT PH,UA: 7
SL AMB POCT SPECIFIC GRAVITY,UA: 1
SL AMB POCT URINE PROTEIN: ABNORMAL
SL AMB POCT UROBILINOGEN: 0.2

## 2020-06-13 PROCEDURE — 87086 URINE CULTURE/COLONY COUNT: CPT | Performed by: PHYSICIAN ASSISTANT

## 2020-06-13 PROCEDURE — 87147 CULTURE TYPE IMMUNOLOGIC: CPT | Performed by: PHYSICIAN ASSISTANT

## 2020-06-13 PROCEDURE — G0382 LEV 3 HOSP TYPE B ED VISIT: HCPCS | Performed by: PHYSICIAN ASSISTANT

## 2020-06-13 PROCEDURE — 81002 URINALYSIS NONAUTO W/O SCOPE: CPT | Performed by: PHYSICIAN ASSISTANT

## 2020-06-14 LAB — BACTERIA UR CULT: ABNORMAL

## 2020-06-24 ENCOUNTER — ROUTINE PRENATAL (OUTPATIENT)
Dept: OBGYN CLINIC | Facility: MEDICAL CENTER | Age: 25
End: 2020-06-24

## 2020-06-24 VITALS
SYSTOLIC BLOOD PRESSURE: 100 MMHG | WEIGHT: 159.2 LBS | DIASTOLIC BLOOD PRESSURE: 60 MMHG | BODY MASS INDEX: 27.33 KG/M2 | TEMPERATURE: 99.6 F

## 2020-06-24 DIAGNOSIS — R82.71 GBS BACTERIURIA: ICD-10-CM

## 2020-06-24 DIAGNOSIS — Z34.93 THIRD TRIMESTER PREGNANCY: Primary | ICD-10-CM

## 2020-06-24 PROCEDURE — PNV: Performed by: OBSTETRICS & GYNECOLOGY

## 2020-06-24 RX ORDER — NITROFURANTOIN 25; 75 MG/1; MG/1
CAPSULE ORAL
Qty: 44 CAPSULE | Refills: 1 | Status: SHIPPED | OUTPATIENT
Start: 2020-06-24 | End: 2020-08-08 | Stop reason: HOSPADM

## 2020-07-06 ENCOUNTER — TELEPHONE (OUTPATIENT)
Dept: OBGYN CLINIC | Facility: MEDICAL CENTER | Age: 25
End: 2020-07-06

## 2020-07-06 NOTE — TELEPHONE ENCOUNTER
Patient left a message on the office voicemail with questions as to if she should be tested for covid  Tried calling patient   No answer/lmom requesting patient to call back

## 2020-07-07 ENCOUNTER — TELEMEDICINE (OUTPATIENT)
Dept: OBGYN CLINIC | Facility: CLINIC | Age: 25
End: 2020-07-07

## 2020-07-07 DIAGNOSIS — Z34.93 THIRD TRIMESTER PREGNANCY: ICD-10-CM

## 2020-07-07 DIAGNOSIS — Z20.822 CLOSE EXPOSURE TO COVID-19 VIRUS: ICD-10-CM

## 2020-07-07 DIAGNOSIS — R82.71 GBS BACTERIURIA: ICD-10-CM

## 2020-07-07 DIAGNOSIS — Z3A.36 36 WEEKS GESTATION OF PREGNANCY: Primary | ICD-10-CM

## 2020-07-07 PROCEDURE — PNV: Performed by: OBSTETRICS & GYNECOLOGY

## 2020-07-07 NOTE — PROGRESS NOTES
Virtual Brief Visit          Reason for visit is prenatal care     Encounter provider Laly Canales MD    Provider located at [facility] [address]   [address]  [facility] [address] [phone]    Recent Visits  No visits were found meeting these conditions  Showing recent visits within past 7 days and meeting all other requirements     Today's Visits  Date Type Provider Dept   20 Lavon Hills MD  Ob/Gyn Care [facility]   Showing today's visits and meeting all other requirements     Future Appointments  No visits were found meeting these conditions  Showing future appointments within next 150 days and meeting all other requirements        After connecting through telephone, the patient was identified by name and date of birth  Carissa Krishnan was informed that this is a telemedicine visit and that the visit is being conducted through telephone  My office door was closed  No one else was in the room  She acknowledged consent and understanding of privacy and security of the platform  The patient has agreed to participate and understands she can discontinue the visit at any time  Patient is aware this is a billable service  Subjective    Carissa Krishnan is a 23 y o  female  at 43w3d who presents for routine prenatal visit  She is without complaint  She denies contractions, loss of fluid, or vaginal bleeding  She feels regular fetal movements  Patient reports recent indirect COVID19 exposure  Reports her friends brother is positive  Her friend is well appearing and asymptomatic; friend currently has pending testing  Patient reports she too feels well  Reports she has not had a fever, cough, or other concerning symptoms       HPI     Past Medical History:   Diagnosis Date    Migraine     Urinary tract infection     frequent UTI in past    Varicella        Past Surgical History: Procedure Laterality Date    BREAST SURGERY      breast augemtation    TOE SURGERY         Current Outpatient Medications   Medication Sig Dispense Refill    nitrofurantoin (MACROBID) 100 mg capsule Take 1 capsule po bid x 7 days then once a day until delivery 44 capsule 1    Prenatal Vit-Fe Fumarate-FA (PRENATAL 1+1 PO) Take 1 tablet by mouth daily       No current facility-administered medications for this visit  Allergies   Allergen Reactions    Penicillins Hives       Review of Systems    There were no vitals filed for this visit  Assessment  23 y o   at 36w1d presenting for routine prenatal visit  Plan  Diagnoses and all orders for this visit:    36 weeks gestation of pregnancy  Third trimester pregnancy  -  labor precautions  - [address] encouraged  - Has FMLA paperwork; advised to fax or have partner/family drop off to office  - Return weekly until delivery (next visit should be virtual given exposure window)    GBS bacteriuria  - Needs ppx in labor  - On suppression    Close exposure to COVID-19 virus  - Advised to isolate self from friend for, at minimum, 2wks beyond friends exposure to her brother  If ongoing exposure, isolate for 2wks beyond brothers cure date  - Advised recommend she monitor her symptoms  If febrile or otherwise symptomatic, recommend testing  - Discussed can call PCP for guidance on testing need  If goes into labor within 2wks of exposure it may be considered  I spent 6 minutes directly with the patient during this visit    [address] [facility] Reshma Springer acknowledges that she has consented to an online visit or consultation  She understands that the online visit is based solely on information provided by her, and that, in the absence of a face-to-face physical evaluation by the physician, the diagnosis she receives is both limited and provisional in terms of accuracy and completeness   This is not intended to replace a full medical face-to-face evaluation by the physician  Plata Minder understands and accepts these terms

## 2020-07-13 ENCOUNTER — ROUTINE PRENATAL (OUTPATIENT)
Dept: OBGYN CLINIC | Facility: MEDICAL CENTER | Age: 25
End: 2020-07-13

## 2020-07-13 VITALS — BODY MASS INDEX: 27.98 KG/M2 | WEIGHT: 163 LBS | SYSTOLIC BLOOD PRESSURE: 116 MMHG | DIASTOLIC BLOOD PRESSURE: 72 MMHG

## 2020-07-13 DIAGNOSIS — Z34.93 THIRD TRIMESTER PREGNANCY: ICD-10-CM

## 2020-07-13 DIAGNOSIS — R82.71 GBS BACTERIURIA: Primary | ICD-10-CM

## 2020-07-13 PROCEDURE — PNV: Performed by: NURSE PRACTITIONER

## 2020-07-13 NOTE — PROGRESS NOTES
Pratik Rea is a 23y o  year old  at 37w0d for routine prenatal visit    + FM, no vaginal bleeding, contractions, or LOF  Complaints: No feels great  Having a boy, first granchild on both sides  Most recent ultrasound and labs reviewed  fkc and labor precautions reviewed  Will try to make appt with Dr Kori Young

## 2020-07-20 ENCOUNTER — TELEPHONE (OUTPATIENT)
Dept: OBGYN CLINIC | Facility: MEDICAL CENTER | Age: 25
End: 2020-07-20

## 2020-07-22 ENCOUNTER — ROUTINE PRENATAL (OUTPATIENT)
Dept: OBGYN CLINIC | Facility: CLINIC | Age: 25
End: 2020-07-22

## 2020-07-22 VITALS
WEIGHT: 163.4 LBS | BODY MASS INDEX: 28.05 KG/M2 | TEMPERATURE: 97.9 F | DIASTOLIC BLOOD PRESSURE: 70 MMHG | SYSTOLIC BLOOD PRESSURE: 110 MMHG

## 2020-07-22 DIAGNOSIS — Z34.03 ENCOUNTER FOR SUPERVISION OF NORMAL FIRST PREGNANCY IN THIRD TRIMESTER: Primary | ICD-10-CM

## 2020-07-22 DIAGNOSIS — Z3A.38 38 WEEKS GESTATION OF PREGNANCY: ICD-10-CM

## 2020-07-22 PROCEDURE — PNV: Performed by: OBSTETRICS & GYNECOLOGY

## 2020-07-22 NOTE — PROGRESS NOTES
Zohaib Ron is a 23y o  year old  at 36w4d for routine prenatal visit    + FM, no vaginal bleeding, contractions, or LOF  Complaints: No   Most recent ultrasound and labs reviewed    Labor precautions

## 2020-07-29 ENCOUNTER — HOSPITAL ENCOUNTER (OUTPATIENT)
Facility: HOSPITAL | Age: 25
Discharge: HOME | End: 2020-07-29
Attending: OBSTETRICS & GYNECOLOGY | Admitting: OBSTETRICS & GYNECOLOGY
Payer: COMMERCIAL

## 2020-07-29 ENCOUNTER — TELEPHONE (OUTPATIENT)
Dept: OBGYN CLINIC | Facility: MEDICAL CENTER | Age: 25
End: 2020-07-29

## 2020-07-29 VITALS
HEART RATE: 76 BPM | BODY MASS INDEX: 27.83 KG/M2 | WEIGHT: 163 LBS | DIASTOLIC BLOOD PRESSURE: 82 MMHG | RESPIRATION RATE: 16 BRPM | HEIGHT: 64 IN | TEMPERATURE: 98.1 F | SYSTOLIC BLOOD PRESSURE: 114 MMHG

## 2020-07-29 PROBLEM — Z3A.39 39 WEEKS GESTATION OF PREGNANCY: Status: ACTIVE | Noted: 2020-07-29

## 2020-07-29 PROCEDURE — 59025 FETAL NON-STRESS TEST: CPT | Performed by: OBSTETRICS & GYNECOLOGY

## 2020-07-29 PROCEDURE — 76815 OB US LIMITED FETUS(S): CPT | Performed by: OBSTETRICS & GYNECOLOGY

## 2020-07-29 PROCEDURE — 99213 OFFICE O/P EST LOW 20 MIN: CPT | Performed by: OBSTETRICS & GYNECOLOGY

## 2020-07-29 PROCEDURE — 99203 OFFICE O/P NEW LOW 30 MIN: CPT

## 2020-07-29 NOTE — PROCEDURES
Heidy Whaley, a  at 26W3W with an TATE of 8/3/2020, by Ultrasound, was seen at [facility] for the following procedure(s): $Procedure Type: TAYLOR]         4 Quadrant TAYLOR  TAYLOR Q1 (cm): 2 5 cm  TAYLOR Q2 (cm): 0 8 cm  TAYLOR Q3 (cm): 6 3 cm  TAYLOR Q4 (cm): 3 8 cm  TAYLOR TOTAL (cm): 13 4 cm

## 2020-07-29 NOTE — TELEPHONE ENCOUNTER
Patient is 39 weeks pregnant  Called with concerns of Leakage of fluids for two days now  No contractions,good fetal movement  No other issues  Pt sent over to [facility]ALDIVAR[facility]DEMETRIUS PER Erum BENOIT  Pt agreed

## 2020-07-29 NOTE — PROGRESS NOTES
Triage Note - OB  Hever Chun 23 y o  female MRN: [id number]  Unit/Bed#: L&D 329-02 Encounter: [id number]    Chief Complaint: leaking fluid since yesterday   EGA: 39 weeks 2 days     HPI: Patient presents after she called the office complaining of leaking since yesterday states it is mucous with clear fluid  No bleeding, no contraction, positive fetal movement present  No recent intercourse  States the leaking that she is noticing is not all the time and is spontaneous at different times and a small amount    Vitals: Blood pressure 114/82, pulse 76, temperature 98 1 °F (36 7 °C), temperature source Oral, resp  rate 16, height 5' 4" (1 626 m), weight 73 9 kg (163 lb)  ,Body mass index is 27 98 kg/m²  Physical Exam  GEN:  Active alert oriented x3,  nonlabored breathing  SSE:  Fingertip/ thick/ -3  SVE :  Speculum exam no pooling, scant amount of mucus in posterior fornix   ferning negative, nitrazine negative,  KOH and wet mount negative  FHR: 130's ,  Reactive,  Category 1  Cologne:  No contractions noted,  Some irritability   TAYLOR performed and 13 3    Labs:   No visits with results within 1 Day(s) from this visit  Latest known visit with results is:   Office Visit on 06/13/2020   Component Date Value    LEUKOCYTE ESTERASE,UA 06/13/2020 small     NITRITE,UA 06/13/2020 neg     SL AMB POCT UROBILINOGEN 06/13/2020 0 2     POCT URINE PROTEIN 06/13/2020 neg      PH,UA 06/13/2020 7 0     BLOOD,UA 06/13/2020 neg     SPECIFIC GRAVITY,UA 06/13/2020 1 005     KETONES,UA 06/13/2020 neg     BILIRUBIN,UA 06/13/2020 neg     GLUCOSE, UA 06/13/2020 neg      COLOR,UA 06/13/2020 yellow     CLARITY,UA 06/13/2020 clear     Urine Culture 06/13/2020 10,000-19,000 cfu/ml Beta Hemolytic Streptococcus Group B*       Lab, Imaging and other studies: I have personally reviewed pertinent reports      A/P:   1)  39 weeks and 2 days gestation not in labor and negative rupture of membranes workup  2) reassuring fetal status testing  3) Discharge instructions given to patient and labor precautions reviewed

## 2020-07-29 NOTE — DISCHARGE INSTRUCTIONS
Pregnancy at 44 to 40 Weeks   [address] [place], [place] [address] :   You are now getting close to your due date  Your due date is just an estimate of when your baby will be born  Your baby may be born before or after your due date  Your breathing may be easier if your baby has moved down into a head-down position  You may need to urinate more often because the baby may be pressing on your bladder  You may also feel more discomfort and tire easily  You may also be having trouble sleeping  DISCHARGE INSTRUCTIONS:   Seek care immediately if:   · You develop a severe headache that does not go away  · You have new or increased vision changes, such as blurred or spotted vision  · You have new or increased swelling in your face or hands  · You have vaginal spotting or bleeding  · Your water broke or you feel warm water gushing or trickling from your vagina  Contact your healthcare provider if:   · You have more than 5 contractions in 1 hour  · You notice any changes in your baby's movements  · You have abdominal cramps, pressure, or tightening  · You have a change in vaginal discharge  · You have chills or a fever  · You have vaginal itching, burning, or pain  · You have yellow, green, white, or foul-smelling vaginal discharge  · You have pain or burning when you urinate, less urine than usual, or pink or bloody urine  · You have questions or concerns about your condition or care  How to care for yourself at this stage of your pregnancy:   · Eat a variety of healthy foods  Healthy foods include fruits, vegetables, whole-grain breads, low-fat dairy foods, beans, lean meats, and fish  Drink liquids as directed  Ask how much liquid to drink each day and which liquids are best for you  Limit caffeine to less than 200 milligrams each day  Limit your intake of fish to 2 servings each week  Choose fish low in mercury such as canned light tuna, shrimp, crab, salmon, cod, or tilapia   Do not  eat fish high in mercury such as swordfish, tilefish, benjamin mackerel, and shark  · Take prenatal vitamins as directed  Your need for certain vitamins and minerals, such as folic acid, increases during pregnancy  Prenatal vitamins provide some of the extra vitamins and minerals you need  Prenatal vitamins may also help to decrease the risk of certain birth defects  · Rest as needed  Put your feet up if you have swelling in your ankles and feet  · Do not smoke  If you smoke, it is never too late to quit  Smoking increases your risk of a miscarriage and other health problems during your pregnancy  Smoking can cause your baby to be born too early or weigh less at birth  Ask your healthcare provider for information if you need help quitting  · Do not drink alcohol  Alcohol passes from your body to your baby through the placenta  It can affect your baby's brain development and cause fetal alcohol syndrome (FAS)  FAS is a group of conditions that causes mental, behavior, and growth problems  · Talk to your healthcare provider before you take any medicines  Many medicines may harm your baby if you take them when you are pregnant  Do not take any medicines, vitamins, herbs, or supplements without first talking to your healthcare provider  Never use illegal or street drugs (such as marijuana or cocaine) while you are pregnant  · Talk to your healthcare provider before you travel  You may not be able to travel in an airplane after 36 weeks  He may also recommend that you avoid long road trips  Safety tips:   · Avoid hot tubs and saunas  Do not use a hot tub or sauna while you are pregnant, especially during your first trimester  Hot tubs and saunas may raise your baby's temperature and increase the risk of birth defects  · Avoid toxoplasmosis  This is an infection caused by eating raw meat or being around infected cat feces  It can cause birth defects, miscarriages, and other problems   Wash your hands after you touch raw meat  Make sure any meat is well-cooked before you eat it  Avoid raw eggs and unpasteurized milk  Use gloves or ask someone else to clean your cat's litter box while you are pregnant  · Ask your healthcare provider about travel  The most comfortable time to travel is during the second trimester  Ask your healthcare provider if you can travel after 36 weeks  You may not be able to travel in an airplane after 36 weeks  He may also recommend that you avoid long road trips  Changes that are happening with your baby: Your baby is ready to be born  At birth, your baby may weigh about 6 to 9 pounds and be about 19 to 21 inches long  Your baby may be in a head-down position  Your baby will also rest lower in your abdomen  What you need to know about prenatal care: Your healthcare provider will check your blood pressure and weight  You may also need the following:  · A urine test  may also be done to check for sugar and protein  These can be signs of gestational diabetes or infection  Protein in your urine may also be a sign of preeclampsia  Preeclampsia is a condition that can develop during week 20 or later of your pregnancy  It causes high blood pressure, and it can cause problems with your kidneys and other organs  · Your baby's heart rate  will be checked  © 2017 [address] Information is for End User's use only and may not be sold, redistributed or otherwise used for commercial purposes  All illustrations and images included in CareNotes® are the copyrighted property of A D A M , Inc  or [employer]  The above information is an  only  It is not intended as medical advice for individual conditions or treatments  Talk to your doctor, nurse or pharmacist before following any medical regimen to see if it is safe and effective for you  (0) independent

## 2020-07-31 ENCOUNTER — ROUTINE PRENATAL (OUTPATIENT)
Dept: OBGYN CLINIC | Facility: MEDICAL CENTER | Age: 25
End: 2020-07-31

## 2020-07-31 VITALS
SYSTOLIC BLOOD PRESSURE: 110 MMHG | TEMPERATURE: 98.3 F | WEIGHT: 164.4 LBS | BODY MASS INDEX: 28.22 KG/M2 | DIASTOLIC BLOOD PRESSURE: 78 MMHG

## 2020-07-31 DIAGNOSIS — Z3A.39 39 WEEKS GESTATION OF PREGNANCY: Primary | ICD-10-CM

## 2020-07-31 DIAGNOSIS — R82.71 GBS BACTERIURIA: ICD-10-CM

## 2020-07-31 PROCEDURE — PNV: Performed by: OBSTETRICS & GYNECOLOGY

## 2020-07-31 NOTE — PROGRESS NOTES
Shelley Menchaca is a 23y o  year old  at 43w3d for routine prenatal visit    + FM, no vaginal bleeding, contractions, or LOF  Complaints: No   Most recent ultrasound and labs reviewed    States feeling uncomfortable and some cramps   [facility] IOL - set up for 20 - will need cervical ripening   Labor precautions and [address] reviewed

## 2020-08-06 ENCOUNTER — ANESTHESIA EVENT (INPATIENT)
Dept: ANESTHESIOLOGY | Facility: HOSPITAL | Age: 25
End: 2020-08-06
Payer: COMMERCIAL

## 2020-08-06 ENCOUNTER — ANESTHESIA (INPATIENT)
Dept: ANESTHESIOLOGY | Facility: HOSPITAL | Age: 25
End: 2020-08-06
Payer: COMMERCIAL

## 2020-08-06 ENCOUNTER — TELEPHONE (OUTPATIENT)
Dept: OTHER | Facility: OTHER | Age: 25
End: 2020-08-06

## 2020-08-06 ENCOUNTER — HOSPITAL ENCOUNTER (INPATIENT)
Facility: HOSPITAL | Age: 25
LOS: 2 days | Discharge: HOME | End: 2020-08-08
Attending: OBSTETRICS & GYNECOLOGY | Admitting: OBSTETRICS & GYNECOLOGY
Payer: COMMERCIAL

## 2020-08-06 DIAGNOSIS — Z34.93 THIRD TRIMESTER PREGNANCY: ICD-10-CM

## 2020-08-06 PROBLEM — Z34.90 CURRENTLY PREGNANT: Status: ACTIVE | Noted: 2020-08-06

## 2020-08-06 PROBLEM — D64.9 ANEMIA: Status: ACTIVE | Noted: 2020-08-06

## 2020-08-06 LAB
ABO GROUP BLD: NORMAL
AMPHETAMINES SERPL QL SCN: NEGATIVE
BARBITURATES UR QL: NEGATIVE
BASE EXCESS BLDCOA CALC-SCNC: -0.2 MMOL/L (ref 3–11)
BASE EXCESS BLDCOV CALC-SCNC: -3.9 MMOL/L (ref 1–9)
BENZODIAZ UR QL: NEGATIVE
BLD GP AB SCN SERPL QL: NEGATIVE
COCAINE UR QL: NEGATIVE
ERYTHROCYTE [DISTWIDTH] IN BLOOD BY AUTOMATED COUNT: 13.1 % (ref 11.6–15.1)
HCO3 BLDCOA-SCNC: 28.2 MMOL/L (ref 17.3–27.3)
HCO3 BLDCOV-SCNC: 18.5 MMOL/L (ref 12.2–28.6)
HCT VFR BLD AUTO: 33.7 % (ref 34.8–46.1)
HGB BLD-MCNC: 11.1 G/DL (ref 11.5–15.4)
MCH RBC QN AUTO: 28.7 PG (ref 26.8–34.3)
MCHC RBC AUTO-ENTMCNC: 32.9 G/DL (ref 31.4–37.4)
MCV RBC AUTO: 87 FL (ref 82–98)
METHADONE UR QL: NEGATIVE
O2 CT VFR BLDCOA CALC: 4.9 ML/DL
OPIATES UR QL SCN: NEGATIVE
OXYCODONE+OXYMORPHONE UR QL SCN: NEGATIVE
OXYHGB MFR BLDCOA: 19.5 %
OXYHGB MFR BLDCOV: 73.6 %
PCO2 BLDCOA: 60.1 MM[HG] (ref 30–60)
PCO2 BLDCOV: 28.6 MM HG (ref 27–43)
PCP UR QL: NEGATIVE
PH BLDCOA: 7.29 [PH] (ref 7.23–7.43)
PH BLDCOV: 7.43 [PH] (ref 7.19–7.49)
PLATELET # BLD AUTO: 261 THOUSANDS/UL (ref 149–390)
PMV BLD AUTO: 11.3 FL (ref 8.9–12.7)
PO2 BLDCOA: 12.8 MM HG (ref 5–25)
PO2 BLDCOV: 31 MM HG (ref 15–45)
RBC # BLD AUTO: 3.87 MILLION/UL (ref 3.81–5.12)
RH BLD: POSITIVE
RPR SER QL: NORMAL
SAO2 % BLDCOV: 18.9 ML/DL
SPECIMEN EXPIRATION DATE: NORMAL
THC UR QL: NEGATIVE
WBC # BLD AUTO: 10.39 THOUSAND/UL (ref 4.31–10.16)

## 2020-08-06 PROCEDURE — 59400 OBSTETRICAL CARE: CPT | Performed by: OBSTETRICS & GYNECOLOGY

## 2020-08-06 PROCEDURE — 0KQM0ZZ REPAIR PERINEUM MUSCLE, OPEN APPROACH: ICD-10-PCS | Performed by: OBSTETRICS & GYNECOLOGY

## 2020-08-06 PROCEDURE — 80307 DRUG TEST PRSMV CHEM ANLYZR: CPT | Performed by: OBSTETRICS & GYNECOLOGY

## 2020-08-06 PROCEDURE — 86850 RBC ANTIBODY SCREEN: CPT | Performed by: OBSTETRICS & GYNECOLOGY

## 2020-08-06 PROCEDURE — 99212 OFFICE O/P EST SF 10 MIN: CPT

## 2020-08-06 PROCEDURE — 86901 BLOOD TYPING SEROLOGIC RH(D): CPT | Performed by: OBSTETRICS & GYNECOLOGY

## 2020-08-06 PROCEDURE — 85027 COMPLETE CBC AUTOMATED: CPT | Performed by: OBSTETRICS & GYNECOLOGY

## 2020-08-06 PROCEDURE — 0UQMXZZ REPAIR VULVA, EXTERNAL APPROACH: ICD-10-PCS | Performed by: OBSTETRICS & GYNECOLOGY

## 2020-08-06 PROCEDURE — 10907ZC DRAINAGE OF AMNIOTIC FLUID, THERAPEUTIC FROM PRODUCTS OF CONCEPTION, VIA NATURAL OR ARTIFICIAL OPENING: ICD-10-PCS | Performed by: OBSTETRICS & GYNECOLOGY

## 2020-08-06 PROCEDURE — 99024 POSTOP FOLLOW-UP VISIT: CPT | Performed by: OBSTETRICS & GYNECOLOGY

## 2020-08-06 PROCEDURE — 82805 BLOOD GASES W/O2 SATURATION: CPT | Performed by: OBSTETRICS & GYNECOLOGY

## 2020-08-06 PROCEDURE — 4A0HXCZ MEASUREMENT OF PRODUCTS OF CONCEPTION, CARDIAC RATE, EXTERNAL APPROACH: ICD-10-PCS | Performed by: OBSTETRICS & GYNECOLOGY

## 2020-08-06 PROCEDURE — 86900 BLOOD TYPING SEROLOGIC ABO: CPT | Performed by: OBSTETRICS & GYNECOLOGY

## 2020-08-06 PROCEDURE — 86592 SYPHILIS TEST NON-TREP QUAL: CPT | Performed by: OBSTETRICS & GYNECOLOGY

## 2020-08-06 RX ORDER — DIAPER,BRIEF,INFANT-TODD,DISP
1 EACH MISCELLANEOUS 4 TIMES DAILY PRN
Status: DISCONTINUED | OUTPATIENT
Start: 2020-08-06 | End: 2020-08-08 | Stop reason: HOSPADM

## 2020-08-06 RX ORDER — SIMETHICONE 80 MG
80 TABLET,CHEWABLE ORAL 4 TIMES DAILY PRN
Status: DISCONTINUED | OUTPATIENT
Start: 2020-08-06 | End: 2020-08-08 | Stop reason: HOSPADM

## 2020-08-06 RX ORDER — SENNOSIDES 8.6 MG
1 TABLET ORAL DAILY
Status: DISCONTINUED | OUTPATIENT
Start: 2020-08-07 | End: 2020-08-08 | Stop reason: HOSPADM

## 2020-08-06 RX ORDER — OXYTOCIN/RINGER'S LACTATE 30/500 ML
1-30 PLASTIC BAG, INJECTION (ML) INTRAVENOUS
Status: DISCONTINUED | OUTPATIENT
Start: 2020-08-06 | End: 2020-08-06

## 2020-08-06 RX ORDER — OXYCODONE HYDROCHLORIDE AND ACETAMINOPHEN 5; 325 MG/1; MG/1
1 TABLET ORAL EVERY 4 HOURS PRN
Status: DISCONTINUED | OUTPATIENT
Start: 2020-08-06 | End: 2020-08-08 | Stop reason: HOSPADM

## 2020-08-06 RX ORDER — OXYCODONE HYDROCHLORIDE AND ACETAMINOPHEN 5; 325 MG/1; MG/1
2 TABLET ORAL EVERY 4 HOURS PRN
Status: DISCONTINUED | OUTPATIENT
Start: 2020-08-06 | End: 2020-08-08 | Stop reason: HOSPADM

## 2020-08-06 RX ORDER — ROPIVACAINE HYDROCHLORIDE 2 MG/ML
INJECTION, SOLUTION EPIDURAL; INFILTRATION; PERINEURAL CONTINUOUS PRN
Status: DISCONTINUED | OUTPATIENT
Start: 2020-08-06 | End: 2020-08-06 | Stop reason: HOSPADM

## 2020-08-06 RX ORDER — ROPIVACAINE HYDROCHLORIDE 2 MG/ML
INJECTION, SOLUTION EPIDURAL; INFILTRATION; PERINEURAL AS NEEDED
Status: DISCONTINUED | OUTPATIENT
Start: 2020-08-06 | End: 2020-08-06

## 2020-08-06 RX ORDER — PHENYLEPHRINE HCL IN 0.9% NACL 1 MG/10 ML
SYRINGE (ML) INTRAVENOUS
Status: DISPENSED
Start: 2020-08-06 | End: 2020-08-06

## 2020-08-06 RX ORDER — ACETAMINOPHEN 325 MG/1
650 TABLET ORAL EVERY 4 HOURS PRN
Status: DISCONTINUED | OUTPATIENT
Start: 2020-08-06 | End: 2020-08-08 | Stop reason: HOSPADM

## 2020-08-06 RX ORDER — CEFAZOLIN SODIUM 1 G/50ML
1000 SOLUTION INTRAVENOUS EVERY 8 HOURS
Status: DISCONTINUED | OUTPATIENT
Start: 2020-08-06 | End: 2020-08-06

## 2020-08-06 RX ORDER — MAGNESIUM HYDROXIDE/ALUMINUM HYDROXICE/SIMETHICONE 120; 1200; 1200 MG/30ML; MG/30ML; MG/30ML
15 SUSPENSION ORAL EVERY 6 HOURS PRN
Status: DISCONTINUED | OUTPATIENT
Start: 2020-08-06 | End: 2020-08-08 | Stop reason: HOSPADM

## 2020-08-06 RX ORDER — SODIUM CHLORIDE, SODIUM LACTATE, POTASSIUM CHLORIDE, CALCIUM CHLORIDE 600; 310; 30; 20 MG/100ML; MG/100ML; MG/100ML; MG/100ML
125 INJECTION, SOLUTION INTRAVENOUS CONTINUOUS
Status: DISCONTINUED | OUTPATIENT
Start: 2020-08-06 | End: 2020-08-06

## 2020-08-06 RX ORDER — ROPIVACAINE HYDROCHLORIDE 2 MG/ML
INJECTION, SOLUTION EPIDURAL; INFILTRATION; PERINEURAL
Status: COMPLETED
Start: 2020-08-06 | End: 2020-08-06

## 2020-08-06 RX ORDER — LIDOCAINE HYDROCHLORIDE AND EPINEPHRINE 15; 5 MG/ML; UG/ML
INJECTION, SOLUTION EPIDURAL AS NEEDED
Status: DISCONTINUED | OUTPATIENT
Start: 2020-08-06 | End: 2020-08-06 | Stop reason: HOSPADM

## 2020-08-06 RX ORDER — CEFAZOLIN SODIUM 2 G/50ML
2000 SOLUTION INTRAVENOUS ONCE
Status: COMPLETED | OUTPATIENT
Start: 2020-08-06 | End: 2020-08-06

## 2020-08-06 RX ORDER — CALCIUM CARBONATE 200(500)MG
1000 TABLET,CHEWABLE ORAL DAILY PRN
Status: DISCONTINUED | OUTPATIENT
Start: 2020-08-06 | End: 2020-08-08 | Stop reason: HOSPADM

## 2020-08-06 RX ORDER — ONDANSETRON 2 MG/ML
4 INJECTION INTRAMUSCULAR; INTRAVENOUS EVERY 6 HOURS PRN
Status: DISCONTINUED | OUTPATIENT
Start: 2020-08-06 | End: 2020-08-06

## 2020-08-06 RX ORDER — OXYTOCIN/RINGER'S LACTATE 30/500 ML
PLASTIC BAG, INJECTION (ML) INTRAVENOUS
Status: DISPENSED
Start: 2020-08-06 | End: 2020-08-07

## 2020-08-06 RX ORDER — ROPIVACAINE HYDROCHLORIDE 2 MG/ML
INJECTION, SOLUTION EPIDURAL; INFILTRATION; PERINEURAL AS NEEDED
Status: DISCONTINUED | OUTPATIENT
Start: 2020-08-06 | End: 2020-08-06 | Stop reason: HOSPADM

## 2020-08-06 RX ORDER — DOCUSATE SODIUM 100 MG/1
100 CAPSULE, LIQUID FILLED ORAL 2 TIMES DAILY
Status: DISCONTINUED | OUTPATIENT
Start: 2020-08-06 | End: 2020-08-08 | Stop reason: HOSPADM

## 2020-08-06 RX ORDER — IBUPROFEN 600 MG/1
600 TABLET ORAL EVERY 6 HOURS PRN
Status: DISCONTINUED | OUTPATIENT
Start: 2020-08-06 | End: 2020-08-08 | Stop reason: HOSPADM

## 2020-08-06 RX ADMIN — ROPIVACAINE HYDROCHLORIDE 10 ML: 2 INJECTION, SOLUTION EPIDURAL; INFILTRATION at 10:13

## 2020-08-06 RX ADMIN — ACETAMINOPHEN 650 MG: 325 TABLET ORAL at 20:39

## 2020-08-06 RX ADMIN — SODIUM CHLORIDE, SODIUM LACTATE, POTASSIUM CHLORIDE, AND CALCIUM CHLORIDE 125 ML/HR: .6; .31; .03; .02 INJECTION, SOLUTION INTRAVENOUS at 09:06

## 2020-08-06 RX ADMIN — CEFAZOLIN SODIUM 2000 MG: 2 SOLUTION INTRAVENOUS at 04:25

## 2020-08-06 RX ADMIN — CEFAZOLIN SODIUM 1000 MG: 1 SOLUTION INTRAVENOUS at 12:12

## 2020-08-06 RX ADMIN — LIDOCAINE HYDROCHLORIDE AND EPINEPHRINE 3 ML: 15; 5 INJECTION, SOLUTION EPIDURAL at 10:08

## 2020-08-06 RX ADMIN — WITCH HAZEL 1 PAD: 500 SOLUTION RECTAL; TOPICAL at 20:39

## 2020-08-06 RX ADMIN — SODIUM CHLORIDE, SODIUM LACTATE, POTASSIUM CHLORIDE, AND CALCIUM CHLORIDE 125 ML/HR: .6; .31; .03; .02 INJECTION, SOLUTION INTRAVENOUS at 04:30

## 2020-08-06 RX ADMIN — ROPIVACAINE HYDROCHLORIDE: 2 INJECTION, SOLUTION EPIDURAL; INFILTRATION at 17:35

## 2020-08-06 RX ADMIN — ROPIVACAINE HYDROCHLORIDE 10 ML/HR: 2 INJECTION, SOLUTION EPIDURAL; INFILTRATION at 11:09

## 2020-08-06 RX ADMIN — DOCUSATE SODIUM 100 MG: 100 CAPSULE, LIQUID FILLED ORAL at 20:39

## 2020-08-06 RX ADMIN — BENZOCAINE AND LEVOMENTHOL: 200; 5 SPRAY TOPICAL at 20:39

## 2020-08-06 RX ADMIN — SODIUM CHLORIDE, SODIUM LACTATE, POTASSIUM CHLORIDE, AND CALCIUM CHLORIDE 125 ML/HR: .6; .31; .03; .02 INJECTION, SOLUTION INTRAVENOUS at 13:15

## 2020-08-06 RX ADMIN — ROPIVACAINE HYDROCHLORIDE: 2 INJECTION, SOLUTION EPIDURAL; INFILTRATION at 11:18

## 2020-08-06 RX ADMIN — Medication 2 MILLI-UNITS/MIN: at 15:46

## 2020-08-06 NOTE — DISCHARGE INSTRUCTIONS
Self Care After Delivery   AMBULATORY CARE:   The postpartum period  is the period of time from delivery to about 6 weeks  During this time you may experience many physical and emotional changes  It is important to understand what is normal and when you need to call your healthcare provider  It is also important to know how to care for yourself during this time  Call 911 for any of the following:   · You soak through 1 pad in 15 minutes, have blurry vision, clammy or pale skin, and feel faint  · You faint or lose consciousness  · Your heart is beating faster than normal      · You have trouble breathing  · You cough up blood  Seek care immediately if:   · You soak through 1 or more pads in an hour, or pass blood clots larger than a quarter from your vagina  · Your leg is painful, red, and larger than normal      · You have severe abdominal pain  · You have a bad headache or changes in your vision  · Your episiotomy or C section incision is red, swollen, bleeding, or draining pus  · Your incision comes apart  · You see or hear things that are not there, or have thoughts of harming yourself or your baby  Contact your obstetrician or midwife if:   · You have a fever  · You have new or worsening pain in your abdomen or vagina  · You continue to have the baby blues 10 days after you deliver  · You have trouble sleeping  · You have foul-smelling discharge from your vagina  · You have pain or burning when you urinate  · You do not have a bowel movement for 3 days or more  · You have nausea or are vomiting  · You have hard lumps or red streaks over your breasts  · You have cracked nipples or bleed from your nipples  · You have questions or concerns about your condition or care  Physical changes:   The following are normal changes after you give birth:  · Pain in the area between your anus and vagina    · Breast pain    · Constipation or hemorrhoids    · Hot or cold flashes    · Vaginal bleeding or discharge    · Mild to moderate abdominal cramping    · Difficulty controlling bowel movements or urine  Emotional changes: The following are symptoms of the baby blues, or normal emotions after you give birth  The changes in your emotions may be caused by a drop in hormone levels after you deliver  If these symptoms last longer than 1 to 2 weeks after you give birth, you may have postpartum depression  · Feeling irritable    · Feeling sad    · Crying for no reason    · Feeling anxious  Breast care for nursing mothers: You may have sore breasts for 3 to 6 days after you give birth  This happens as your milk begins to fill your breasts  You may also have sore breasts if you do not breastfeed frequently  Do the following to care for your breasts:  · Apply a moist, warm, compress to your breast as directed  This may help soothe your breasts  Make sure the washcloth is not too hot before you apply it to your breast      · Nurse your baby or pump your milk frequently  This may prevent clogged milk ducts  Ask your healthcare provider how often to nurse or pump  · Massage your breasts as directed  This may help increase your milk flow  Gently rub your breasts in a circular motion before you breastfeed  You may need to gently squeeze your breast or nipple to help release milk  You can also use a breast pump to help release milk from your breast      · Wash your breasts with warm water only  Do not put soap on your nipples  Soap may cause your nipples to become dry  · Apply lanolin cream to your nipples as directed  Lanolin cream may add moisture to your skin and prevent nipple dryness  Always  wash off lanolin cream with warm water before you breastfeed  · Place pads in your bra  Your nipples may leak milk when you are not breastfeeding  You can place pads inside of your bra to help prevent leaking onto your clothing   Ask your healthcare provider where to purchase bra pads  · Get breastfeeding support if needed  There are healthcare providers who can answer questions about breastfeeding and provide you with support  Ask your healthcare provider who you can contact if you need breastfeeding support  Breast care for non-nursing mothers:  Milk will fill your breasts even if you bottle feed your baby  Do the following to help stop your milk from filling your breasts and causing pain:  · Wear a bra with support at all times  A sports bra or a tight-fitting bra will help stop your milk from coming in  · Apply ice on each breast for 15 to 20 minutes every hour or as directed  Use an ice pack, or put crushed ice in a plastic bag  Cover it with a towel  Ice helps your milk ducts shrink  · Keep your breasts away from warm water  Warm water will make it easier for milk to fill your breasts  Stand with your breasts away from warm water in the shower  · Limit how much you touch your breasts  This will prevent them from filling with milk  Perineum care: Your perineum is the area between your rectum and vagina  It is normal to have swelling and pain in this area after you give birth  If you had an episiotomy, your healthcare provider may give you special instructions  · Clean your perineum after you use the bathroom  This may prevent infection and help with healing  Use a spray bottle with warm water to clean your perineum  You may also gently spray warm water against your perineum when you urinate  Always wipe front to back  · Take a sitz bath as directed  A sitz bath may help relieve swelling and pain  Fill your bath tub or bucket with water up to your hips and sit in the water  Use cold water for 2 days after you deliver  Then use warm water  Ask your healthcare provider for more information about a sitz bath  · Apply ice packs for the first 24 hours or as directed  Use a plastic glove filled with ice or buy an ice pack   Wrap the ice pack or plastic glove in a small towel or wash cloth  Place the ice pack on your perineum for 20 minutes at a time  · Sit on a donut-shaped pillow  This may relieve pressure on your perineum when you sit  · Use wipes with medicine or take pills as directed  Your healthcare provider may tell you to use witch hazel pads  You can place witch hazel pads in the refrigerator before you apply them to your perineum  He may also tell you to take NSAIDs  Ask your healthcare provider how often to take pills or use wipes with medicine  · Do not go swimming or take tub baths for 4 to 6 weeks or as directed  This will help prevent an infection in your vagina or uterus  Bowel and bladder care: It may take 3 to 5 days to have a bowel movement after you deliver your baby  You can do the following to prevent or manage constipation, and get control of your bowel or bladder:  · Take stool softeners as directed  A stool softener is medicine that will make your bowel movements softer  This may prevent or relieve constipation  A stool softener may also make bowel movements less painful  · Drink plenty of liquids  Ask how much liquid to drink each day and which liquids are best for you  Liquids may help prevent constipation  · Eat foods high in fiber  Examples include fruits, vegetables, grains, beans, and lentils  Ask your healthcare provider how much fiber you need each day  Fiber may prevent constipation  · Do Kegel exercises as directed  Kegel exercises will help strengthen the muscles that control bowel movements and urination  Ask your healthcare provider for more information on Kegel exercises  · Apply cold compresses or medicine to hemorrhoids as directed  This may relieve swelling and pain  Your healthcare provider may tell you to apply ice or wipes with medicine to your hemorrhoids  He may also tell you to use a sitz bath   Ask your healthcare for more information on how to manage hemorrhoids  Nutrition:  Good nutrition is important in the postpartum period  It will help you return to a healthy weight, increase your energy levels, and prevent constipation  It will also help you get enough nutrients and calories if you are going to breastfeed your baby  · Eat a variety of healthy foods  Healthy foods include fruits, vegetables, whole-grain breads, low-fat dairy products, beans, lean meats, and fish  You may need 500 to 700 additional calories each day if you breastfeed your baby  You may also need extra protein  · Limit foods with added sugar and high amounts of fat  These foods are high in calories and low in healthy nutrients  Read food labels so you know how much sugar and fat is in the food you want to eat  · Drink 8 to 10 glasses of water per day  Water will help you make plenty of milk for your baby  It will also help prevent constipation  Drink a glass of water every time you breastfeed your baby  · Take vitamins as directed  Ask your healthcare provider what vitamins you need  · Limit caffeine and alcohol if you are breastfeeding  Caffeine and alcohol can get into your breast milk  Caffeine and alcohol can make your baby fussy  They can also interfere with your baby's sleep  Ask your healthcare provider if you can drink alcohol or caffeine  Rest and sleep: You may feel very tired in the postpartum period  Enough sleep will help you heal and give you energy to care for your baby  The following may help you get sleep and rest:  · Nap when your baby naps  Your baby may nap several times during the day  Get rest during this time  · Limit visitors  Many people may want to see you and your baby  Ask friends or family to visit on different days  This will give you time to rest      · Do not plan too much for one day  Put off household chores so that you have time to rest  Gradually do more each day  · Ask for help from family, friends, or neighbors    Ask them to help you with laundry, cleaning, or errands  Also ask someone to watch the baby while you take a nap or relax  Ask your partner to help with the care of your baby  Pump some of your breast milk so your partner can feed your baby during the night  Exercise after delivery:  Wait until your healthcare provider says it is okay to exercise  Exercise can help you lose weight, increase your energy levels, and manage your mood  It can also prevent constipation and blood clots  Start with gentle exercises such as walking  Do more as you have more energy  You may need to avoid abdominal exercises for 1 to 2 weeks after you deliver  Talk to your healthcare provider about an exercise plan that is right for you  Sexual activity after delivery:   · Do not have sex until your healthcare provider says it is okay  You may need to wait 4 to 6 weeks before you have sex  This may prevent infection and allow time to heal      · Your menstrual cycle may begin as soon as 3 weeks after you deliver  Your period may be delayed if you breastfeed your baby  You can become pregnant before you get your first postpartum period  Talk to your healthcare provider about birth control that is right for you  Some types of birth control are not safe during breastfeeding  For support and more information:  Join a support group for new mothers  Ask for help from family and friends with chores, errands, and care of your baby  · Office of Women's Health, [place] Department of Health and Human Services  [address], [id number] [place] [address]  [address], [id number] [place] [address]  Phone: 0- [phone] - [phone]  Web Address: www womenshealth gov  · [facility] of [facility] Postpartum [address] [facility] , [address] [place] [address]  [address]  Web Address: [employer]  org/pregnancy/postpartum-care  aspx  Follow up with your obstetrician or midwife as directed:   You will need to follow up with your healthcare provider in 2 to 6 weeks after delivery  Write down your questions so you remember to ask them at your visits  © 2017 [address] Information is for End User's use only and may not be sold, redistributed or otherwise used for commercial purposes  All illustrations and images included in CareNotes® are the copyrighted property of A D A M , Inc  or [employer]  The above information is an  only  It is not intended as medical advice for individual conditions or treatments  Talk to your doctor, nurse or pharmacist before following any medical regimen to see if it is safe and effective for you

## 2020-08-06 NOTE — OB LABOR/OXYTOCIN SAFETY PROGRESS
Labor Progress Note - Ata Bailey 23 y o  female MRN: [id number]    Unit/Bed#: L&D 325-01 Encounter: [id number]       Contraction Frequency (minutes): 2-5  Contraction Quality: Mild  Tachysystole: No   Cervical Dilation: 6        Cervical Effacement: 100  Fetal Station: -1  Baseline Rate: 130 bpm     FHR Category: Category I               Vital Signs:   Vitals:    08/06/20 0400   BP: 119/65   Pulse:    Resp:    Temp:            Notes/comments:      Pt desires epidural, will d/w Dr Arlyn Carlson  Cat 1 tracing, pt continuing to make progress   Membranes are taut and plan to AROM next check after epidural     Viri Rios DO 8/6/2020 9:10 AM

## 2020-08-06 NOTE — OB LABOR/OXYTOCIN SAFETY PROGRESS
Oxytocin Safety Progress Check Note - Ivory Gomez 23 y o  female MRN: [id number]    Unit/Bed#: L&D 325-01 Encounter: [id number]       Contraction Frequency (minutes): 2-3  Contraction Quality: Moderate  Tachysystole: No   Cervical Dilation: 8        Cervical Effacement: 100  Fetal Station: 0  Baseline Rate: 130 bpm     FHR Category: Category I               Vital Signs: normal  Vitals:    08/06/20 1437   BP: 123/79   Pulse: 88   Resp:    Temp:    SpO2:            Notes/comments:      Pt unchanged, IUPC placed now  Will observe MVUs  Continue pitocin titration to MVUs >200 (200-250)   Dr Jenny Ramos aware Pt will sit up in throne position    Yan Carrasco DO 8/6/2020 3:23 PM

## 2020-08-06 NOTE — H&P
H&P Exam - Obstetrics   Audrey Chino 23 y o  female MRN: [id number]  Unit/Bed#: L&D 325-01 Encounter: [id number]      History of Present Illness     Chief Complaint: Active labor    HPI:  Audrey Chino is a 23 y o   female with an TATE of 8/3/2020, by Ultrasound at 40w3d weeks gestation who is being admitted for active labor  She started having contractions last night around 8-9pm, and they have been increasing in frequency and intensity  Contractions: present  Loss of fluid: no  Vaginal bleeding: no  Fetal movement: present    She is an [address] [place] [address] patient  PREGNANCY COMPLICATIONS:   1) GBS bacteriuria  2) THC use    OB History    Para Term  AB Living   1 0           SAB TAB Ectopic Multiple Live Births                  # Outcome Date GA Lbr Eron/2nd Weight Sex Delivery Anes PTL Lv   1 Current                Baby complications/comments: none    Review of Systems   Constitutional: Negative for fever  HENT: Negative for rhinorrhea, sinus pressure, sneezing and sore throat  Eyes: Negative for visual disturbance  Respiratory: Negative for cough, shortness of breath and wheezing  Cardiovascular: Negative for chest pain, palpitations and leg swelling  Gastrointestinal: Negative for abdominal distention, abdominal pain, blood in stool, nausea and vomiting  Genitourinary: Negative for dysuria, flank pain, vaginal bleeding and vaginal discharge  Musculoskeletal: Negative for neck pain and neck stiffness  Skin: Negative for color change, pallor and rash  Neurological: Negative for light-headedness, numbness and headaches           Historical Information   Past Medical History:   Diagnosis Date    Migraine     Urinary tract infection     frequent UTI in past    Varicella      Past Surgical History:   Procedure Laterality Date    BREAST SURGERY      breast augemtation    TOE SURGERY       Social History   Social History     Substance and Sexual Activity   Alcohol Use Not Currently    Comment: socially prior to pregnancy     Social History     Substance and Sexual Activity   Drug Use Never     Social History     Tobacco Use   Smoking Status Never Smoker   Smokeless Tobacco Never Used     Family History: non-contributory    Meds/Allergies      Medications Prior to Admission   Medication    nitrofurantoin (MACROBID) 100 mg capsule    Prenatal Vit-Fe Fumarate-FA (PRENATAL 1+1 PO)        Allergies   Allergen Reactions    Penicillins Hives       OBJECTIVE:    Vitals: Blood pressure 119/65, pulse 67, temperature 99 °F (37 2 °C), temperature source Oral, resp  rate 16, height 5' 4" (1 626 m), weight 74 8 kg (165 lb)  Body mass index is 28 32 kg/m²  Physical Exam   Constitutional: She is oriented to person, place, and time  She appears well-developed  No distress  HENT:   Head: Normocephalic and atraumatic  Eyes: Pupils are equal, round, and reactive to light  Neck: Normal range of motion  Pulmonary/Chest: No respiratory distress  Abdominal: Soft  She exhibits no distension and no mass  There is no abdominal tenderness  There is no guarding  Musculoskeletal: Normal range of motion  General: No deformity  Neurological: She is alert and oriented to person, place, and time  Skin: Skin is warm and dry  She is not diaphoretic     Psychiatric: Her behavior is normal            Cervix:    4 5/70/-3, posterior, soft    Fetal heart rate:   Baseline Rate: 130 bpm  Variability: Moderate 6-25 bpm  Decelerations: None  FHR Category: Category I    [place]:   Contraction Frequency (minutes): q4-6  Contraction Quality: Mild    EFW: 7lbs    GBS: positive    Prenatal Labs:   Blood Type:   Lab Results   Component Value Date/Time    ABO Grouping B 02/24/2020 09:04 AM     , D (Rh type):   Lab Results   Component Value Date/Time    Rh Factor Positive 02/24/2020 09:04 AM   , HCT/HGB:   Lab Results   Component Value Date/Time    Hematocrit 34 1 (L) 05/15/2020 11:23 AM    Hemoglobin 11 4 (L) 05/15/2020 11:23 AM      , MCV:   Lab Results   Component Value Date/Time    MCV 92 05/15/2020 11:23 AM      , Platelets:   Lab Results   Component Value Date/Time    Platelets 455  11:23 AM      , 1 hour Glucola:   Lab Results   Component Value Date/Time    Glucose 127 05/15/2020 11:23 AM    Varicella:   Lab Results   Component Value Date/Time    Varicella IgG NON-IMMUNE (A) 2017 04:09 PM       , Rubella:   Lab Results   Component Value Date/Time    Rubella IgG Quant >175 0 2020 09:04 AM        , VDRL/RPR:   Lab Results   Component Value Date/Time    RPR Non-Reactive 2020 09:04 AM      , Urine Culture/Screen:   Lab Results   Component Value Date/Time    Urine Culture (A) 2020 09:33 AM     10,000-19,000 cfu/ml Beta Hemolytic Streptococcus Group B      , Hep B:   Lab Results   Component Value Date/Time    Hepatitis B Surface Ag Non-reactive 2020 09:04 AM   , HIV:   Lab Results   Component Value Date/Time    HIV-1/HIV-2 Ab Non-Reactive 2020 09:04 AM     , Chlamydia: Negative    , Gonorrhea:   Lab Results   Component Value Date/Time    N gonorrhoeae, DNA Probe Negative 2020 04:23 PM     , Group B Strep:  Positive    Invasive Devices     None                   Assessment/Plan     ASSESSMENT:  26yo  at 40w3d weeks gestation who is being admitted for active labor  PLAN:   1) Admit   2) CBC, RPR, Blood Type   3) Start with expectant management   4) GBS positive status: penicillin allergy, has tolerated "something similar to penicillin before," will start ancef for prophylaxis    5) Analgesia and/or epidural at patient request   6) Anticipate    7) UDS for history of THC use   8) Discussed with Dr Barbee Frankel      This patient will be an INPATIENT  and I certify the anticipated length of stay is >2 Midnights      Ken Mcarthur MD  2020  3:26 AM

## 2020-08-06 NOTE — PLAN OF CARE
Problem: Knowledge Deficit  Goal: Verbalizes understanding of labor plan  Description: Assess patient/family/caregiver's baseline knowledge level and ability to understand information  Provide education via patient/family/caregiver's preferred learning method at appropriate level of understanding  1  Provide teaching at level of understanding  2  Provide teaching via preferred learning method(s)  Outcome: Progressing     Problem: Labor & Delivery  Goal: Manages discomfort  Description: Assess and monitor for signs and symptoms of discomfort  Assess patient's pain level regularly and per hospital policy  Administer medications as ordered  Support use of nonpharmacological methods to help control pain such as distraction, imagery, relaxation, and application of heat and cold  Collaborate with interdisciplinary team and patient to determine appropriate pain management plan  1  Include patient in decisions related to comfort  2  Offer non-pharmacological pain management interventions  3  Report ineffective pain management to physician  Outcome: Progressing  Goal: Patient vital signs are stable  Description: 1  Assess vital signs - vaginal delivery    Outcome: Progressing

## 2020-08-06 NOTE — OB LABOR/OXYTOCIN SAFETY PROGRESS
Labor Progress Note - Lupe uLgo 23 y o  female MRN: [id number]    Unit/Bed#: L&D 325-01 Encounter: [id number]       Contraction Frequency (minutes): 3-4  Contraction Quality: Moderate  Tachysystole: No   Cervical Dilation: 8        Cervical Effacement: 100  Fetal Station: -1  Baseline Rate: 130 bpm     FHR Category: Category I               Vital Signs:   Vitals:    08/06/20 1207   BP: 138/70   Pulse: 77   Resp:    Temp:    SpO2:            Notes/comments:   Patient is comfortable at this time with her epidural  AROM for clear  Will recheck as she feels more uncomfortable  D/w Dr Marcello Ocasio MD 8/6/2020 12:25 PM

## 2020-08-06 NOTE — L&D DELIVERY NOTE
Vaginal Delivery Summary - OB/GYN   Milton De La Vega 23 y o  female MRN: [id number]  Unit/Bed#: L&D 325-01 Encounter: [id number]          Predelivery Diagnosis:  1  Pregnancy at 40w3d  2  GBS carrier status  3  Penicillin allergy   4  History of THC use    Postdelivery Diagnosis:  1  Same as above  2  Delivery of term     Procedure: Spontaneous Vaginal Delivery, repair of second degree laceration    Attending: Otto    Assistant: Skip    Anesthesia: Epidural    Quantified blood loss (mL): 111  Admission Hg:   Lab Results   Component Value Date    HGB 11 1 (L) 2020      Admission platelets:   Lab Results   Component Value Date             Complications: none apparent    Specimens: cord blood, arterial and venous cord blood gasses, placenta to storage    Findings:   1  Viable male at [address], with APGARS of 8 and 9 at 1 and 5 minutes respectively,  2  Spontaneous delivery of intact placenta at 1847  3  2 degree laceration repaired with 3-0 Vicryl  4  Blood gases:    Umbilical Cord Venous Blood Gas:  Results from last 7 days   Lab Units 20  1844   PH COV  7 429   PCO2 COV mm HG 28 6   HCO3 COV mmol/L 18 5   BASE EXC COV mmol/L -3 9*   O2 CT CD VB mL/dL 18 9   O2 HGB, VENOUS CORD % 99 5     Umbilical Cord Arterial Blood Gas:  Results from last 7 days   Lab Units 20  1844   PH COA  7 289   PCO2 COA  60 1*   PO2 COA mm HG 12 8   HCO3 COA mmol/L 28 2*   BASE EXC COA mmol/L -0 2*   O2 CONTENT CORD ART ml/dl 4 9   O2 HGB, ARTERIAL CORD % 19 5       Disposition:  Patient tolerated the procedure well and was recovering in labor and delivery room     Brief history and labor course:  Ms Milton De La Vega is a 23 y o   at 37wk4d  She presented to labor and delivery in labor  She received ancef for GBS positive status with penicillin allergy, and progressed well  She received pitocin and amniotomy for augmentation and an epidural for analgesia   She was complete at 1819 and started pushing  Description of procedure    Warm compresses were applied during pushing and perineal massage was performed  After pushing for 21 minutes, at [address] patient delivered a viable male , wt pending, apgars of 8 (1 min) and 9 (5 min)  The fetal vertex delivered spontaneously  Baby was checked for nuchal  One loose loop around body noted, reduced  The anterior shoulder delivered atraumatically with maternal expulsive forces and the assistance of gentle downward traction  The posterior shoulder delivered with maternal expulsive forces and the assistance of gentle upward traction  The remainder of the fetus delivered spontaneously  Upon delivery, the infant was placed on the mothers abdomen and the cord was clamped and cut  Delayed cord clamping was performed  The infant was noted to cry spontaneously and had all signs of life  There was no evidence for injury  Awaiting nurse resuscitators evaluated the   Arterial and venous cord blood gases and cord blood was collected for analysis  These were promptly sent to the lab  In the immediate post-partum, 30 units of IV pitocin was administered, and the uterus was noted to contract down well with massage and pitocin  The placenta delivered spontaneously at [address] and was noted to have a centrally inserted 3 vessel cord  The vagina, cervix, perineum, and rectum were inspected and there was noted to be a small second degree laceration and right labial laceration requiring repair  The apex of the vaginal laceration was identified and a suture of 3-0 Vicryl was placed 1cm above the apex  The vaginal mucosa and underlying rectovaginal fascia were closed using a running locked suture to the hymenal ring  The suture was then brought underneath the hymenal ring  A stitch was then placed through the bulbocavernosus muscle  Continuing with the same suture, the transverse perineal muscles were re-approximated   The suture was brought to the posterior apex of the skin laceration and then the skin was re-approximated in a subcuticular fashion to the hymenal ring  The right labial laceration was repaired in a running fashion with the same suture  Hemostasis was achieved  At the conclusion of the procedure, all needle, sponge, and instrument counts were noted to be correct  Magdalena Bracket showed no retained sponges  Patient tolerated the procedure well and was allowed to recover in labor and delivery room with family and  before being transferred to the post-partum floor  Dr Neelam Boyce was present and participated in all key portions of the case          Carey Ortiz MD  2020  7:03 PM

## 2020-08-06 NOTE — OB LABOR/OXYTOCIN SAFETY PROGRESS
Labor Progress Note - Boni Soliz 23 y o  female MRN: [id number]    Unit/Bed#: L&D 325-01 Encounter: [id number]       Contraction Frequency (minutes): 4  Contraction Quality: Moderate  Tachysystole: No   Cervical Dilation: 6        Cervical Effacement: 100  Fetal Station: -1  Baseline Rate: 130 bpm     FHR Category: Category I               Vital Signs:   Vitals:    08/06/20 0400   BP: 119/65   Pulse:    Resp:    Temp:            Notes/comments:    Patient is uncomfortable at this time and requesting an epidural  The plan is to AROM her after epidural placement and if she does not progress by the following check, will start pitocin  Discussed w/ Dr Madison Redmond MD  PGY-1 OB/GYN   8/6/2020 9:29 AM      Riccardo Redmond MD 8/6/2020 9:25 AM

## 2020-08-06 NOTE — ANESTHESIA PREPROCEDURE EVALUATION
Procedure:  LABOR ANALGESIA    Relevant Problems   ANESTHESIA (within normal limits)      CARDIO (within normal limits)      ENDO (within normal limits)      GI/HEPATIC (within normal limits)      /RENAL (within normal limits)      GYN   (+) Currently pregnant      HEMATOLOGY   (+) Anemia      MUSCULOSKELETAL (within normal limits)      NEURO/PSYCH   (+) Anxiety      PULMONARY (within normal limits)        Physical Exam    Airway    Mallampati score: II  TM Distance: >3 FB  Neck ROM: full     Dental   No notable dental hx     Cardiovascular  Rhythm: regular, Rate: normal,     Pulmonary  Pulmonary exam normal Breath sounds clear to auscultation,     Other Findings        Anesthesia Plan  ASA Score- 2     Anesthesia Type-     Plan Factors-    Chart reviewed  Existing labs reviewed  Patient summary reviewed  Induction-     Postoperative Plan-     Informed Consent- Anesthetic plan and risks discussed with patient

## 2020-08-06 NOTE — PLAN OF CARE
Problem: Knowledge Deficit  Goal: Verbalizes understanding of labor plan  Description: Assess patient/family/caregiver's baseline knowledge level and ability to understand information  Provide education via patient/family/caregiver's preferred learning method at appropriate level of understanding  1  Provide teaching at level of understanding  2  Provide teaching via preferred learning method(s)  Outcome: Completed     Problem: Labor & Delivery  Goal: Manages discomfort  Description: Assess and monitor for signs and symptoms of discomfort  Assess patient's pain level regularly and per hospital policy  Administer medications as ordered  Support use of nonpharmacological methods to help control pain such as distraction, imagery, relaxation, and application of heat and cold  Collaborate with interdisciplinary team and patient to determine appropriate pain management plan  1  Include patient in decisions related to comfort  2  Offer non-pharmacological pain management interventions  3  Report ineffective pain management to physician  Outcome: Completed  Goal: Patient vital signs are stable  Description: 1  Assess vital signs - vaginal delivery    Outcome: Completed     Problem: POSTPARTUM  Goal: Experiences normal postpartum course  Description: INTERVENTIONS:  - Monitor maternal vital signs  - Assess uterine involution and lochia  Outcome: Progressing  Goal: Appropriate maternal -  bonding  Description: INTERVENTIONS:  - Identify family support  - Assess for appropriate maternal/infant bonding   -Encourage maternal/infant bonding opportunities  - Referral to  or  as needed  Outcome: Progressing  Goal: Establishment of infant feeding pattern  Description: INTERVENTIONS:  - Assess breast/bottle feeding  - Refer to lactation as needed  Outcome: Progressing  Goal: Incision(s), wounds(s) or drain site(s) healing without S/S of infection  Description: INTERVENTIONS  - Assess and document risk factors for skin impairment   - Assess and document dressing, incision, wound bed, drain sites and surrounding tissue  - Consider nutrition services referral as needed  - Oral mucous membranes remain intact  - Provide patient/ family education  Outcome: Progressing     Problem: PAIN - ADULT  Goal: Verbalizes/displays adequate comfort level or baseline comfort level  Description: Interventions:  - Encourage patient to monitor pain and request assistance  - Assess pain using appropriate pain scale  - Administer analgesics based on type and severity of pain and evaluate response  - Implement non-pharmacological measures as appropriate and evaluate response  - Consider cultural and social influences on pain and pain management  - Notify physician/advanced practitioner if interventions unsuccessful or patient reports new pain  Outcome: Progressing

## 2020-08-06 NOTE — TELEPHONE ENCOUNTER
[phone]/CKCQEPL Nancy/GLADYS 1995/Patient is 40 weeks pregnant/Contractions are 45 to a minute long and are every 5 minutes apart/Patient stated she will be headed to the [facility]

## 2020-08-06 NOTE — OB LABOR/OXYTOCIN SAFETY PROGRESS
Labor Progress Note - Herberth Crowe 23 y o  female MRN: [id number]    Unit/Bed#: L&D 325-01 Encounter: [id number]       Contraction Frequency (minutes): 3-4  Contraction Quality: Moderate  Tachysystole: No   Cervical Dilation: 8        Cervical Effacement: 100  Fetal Station: 0  Baseline Rate: 130 bpm     FHR Category: Category I               Vital Signs:   Vitals:    08/06/20 1207   BP: 138/70   Pulse: 77   Resp:    Temp:    SpO2:            Notes/comments:    will continue current management   Anticipate vaginal delivery     Jose G Cook MD 8/6/2020 12:43 PM

## 2020-08-06 NOTE — OB LABOR/OXYTOCIN SAFETY PROGRESS
Labor Progress Note - Jatinder Dan 23 y o  female MRN: [id number]    Unit/Bed#: L&D 325-01 Encounter: [id number]       Contraction Frequency (minutes): 2-5  Contraction Quality: Mild  Tachysystole: No   Cervical Dilation: 5-6        Cervical Effacement: 70  Fetal Station: -2  Baseline Rate: 130 bpm     FHR Category: Category I               Vital Signs:   Vitals:    08/06/20 0400   BP: 119/65   Pulse:    Resp:    Temp:            Notes/comments:    Evaluated patient after 2 hours  Becoming more uncomfortable  Found to be changed to 5 5cm dilated  Will plan to walk  Will continue to monitor      Discussed with Dr Abdullahi Chandra MD 8/6/2020 6:25 AM

## 2020-08-06 NOTE — ANESTHESIA PROCEDURE NOTES
Epidural Block    Patient location during procedure: OB  Start time: 8/6/2020 9:53 AM  Reason for block: at surgeon's request and primary anesthetic  Staffing  Anesthesiologist: Melva Mendez DO  Preanesthetic Checklist  Completed: patient identified, site marked, surgical consent, pre-op evaluation, timeout performed, IV checked, risks and benefits discussed and monitors and equipment checked  Epidural  Patient position: sitting  Prep: Betadine  Patient monitoring: heart rate and frequent blood pressure checks  Approach: midline  Location: lumbar (1-5)  Injection technique: JOHANNA air  Needle  Needle type: Tuohy   Needle gauge: 18 G  Catheter type: side hole  Catheter size: 20 G  Catheter at skin depth: 10 cm  Test dose: negativenegative aspiration for CSF, negative aspiration for heme and no paresthesia on injection  patient tolerated the procedure well with no immediate complications Mother

## 2020-08-06 NOTE — OB LABOR/OXYTOCIN SAFETY PROGRESS
Oxytocin Safety Progress Check Note - Irvin [place] 23 y o  female MRN: [id number]    Unit/Bed#: L&D 325-01 Encounter: [id number]    Dose (marcela-units/min) Oxytocin: 8 marcela-units/min  Contraction Frequency (minutes): 1-3  Contraction Quality: Mild  Tachysystole: No   Cervical Dilation: Lip/rim (Comment)        Cervical Effacement: 100  Fetal Station: 0  Baseline Rate: 130 bpm     FHR Category: Category I               Vital Signs: normal  Vitals:    08/06/20 1437   BP: 123/79   Pulse: 88   Resp:    Temp:    SpO2:            Notes/comments:     Anterior lip, ROP, will turn pt to peanut ball   Dr Kesha Rizzo DO 8/6/2020 5:02 PM

## 2020-08-06 NOTE — OB LABOR/OXYTOCIN SAFETY PROGRESS
Labor Progress Note - General Poisson 23 y o  female MRN: [id number]    Unit/Bed#: L&D 325-01 Encounter: [id number]       Contraction Frequency (minutes): 5  Contraction Quality: Mild  Tachysystole: No   Cervical Dilation: 7        Cervical Effacement: 100  Fetal Station: -1  Baseline Rate: 120 bpm     FHR Category: Category II               Vital Signs:   Vitals:    08/06/20 1000   BP: 134/74   Pulse: (!) 114   Resp:    Temp:            Notes/comments:   Patient had a prolonged early deceleration lasting 2 minutes  Checked on the patient with Dr Vanessa Gomez, MICHELE 7/100/-1  She has just received her epidural and her BP was 93/54  Anesthesia was made aware  The fetal tracing went back up to 140  Plan to start AROM soon  D/w Dr Srini Bella MD  PGY-1 OB/GYN   8/6/2020 10:45 AM         Daylin Bella MD 8/6/2020 10:43 AM

## 2020-08-07 PROCEDURE — 99024 POSTOP FOLLOW-UP VISIT: CPT | Performed by: OBSTETRICS & GYNECOLOGY

## 2020-08-07 RX ADMIN — DOCUSATE SODIUM 100 MG: 100 CAPSULE, LIQUID FILLED ORAL at 17:13

## 2020-08-07 RX ADMIN — SENNOSIDES 8.6 MG: 8.6 TABLET, FILM COATED ORAL at 08:45

## 2020-08-07 RX ADMIN — IBUPROFEN 600 MG: 600 TABLET, FILM COATED ORAL at 02:04

## 2020-08-07 RX ADMIN — DOCUSATE SODIUM 100 MG: 100 CAPSULE, LIQUID FILLED ORAL at 08:45

## 2020-08-07 RX ADMIN — IBUPROFEN 600 MG: 600 TABLET, FILM COATED ORAL at 19:34

## 2020-08-07 NOTE — LACTATION NOTE
This note was copied from a baby's chart  CONSULT - LACTATION  Baby Boy Dennie Shavers) Nikki Patterson 1 days male MRN: [id number]    [facility], [facility] NURSERY Room / Bed: L&D 312(N)/L&D 312(N) Encounter: [id number]    Maternal Information     MOTHER:  Mariajose Matthew  Maternal Age: 23 y o    OB History: # 1 - Date: 20, Sex: Male, Weight: 3745 g (8 lb 4 1 oz), GA: 40w3d, Delivery: Vaginal, Spontaneous, Apgar1: 8, Apgar5: 9, Living: Living, Birth Comments: None   Previouse breast reduction surgery? No    Lactation history:   Has patient previously breast fed: No   How long had patient previously breast fed:     Previous breast feeding complications:       Past Surgical History:   Procedure Laterality Date    BREAST SURGERY      breast augemtation    TOE SURGERY          Birth information:  YOB: 2018   Time of birth: 11:44 PM   Sex: male   Delivery type: Vaginal, Spontaneous   Birth Weight: 3745 g (8 lb 4 1 oz)   Percent of Weight Change: 0%     Gestational Age: 44w3d   [unfilled]    Assessment     Breast and nipple assessment: normal assessment     Assessment: normal assessment    Feeding assessment: feeding well  LATCH:  Latch: Grasps breast, tongue down, lips flanged, rhythmic sucking   Audible Swallowing: Spontaneous and intermittent (24 hours old)   Type of Nipple: Everted (After stimulation)   Comfort (Breast/Nipple): Filling, red/small blisters/bruises, mild/moderate discomfort   Hold (Positioning): Partial assist, teach one side, mother does other, staff holds   LATCH Score: 8          Feeding recommendations:  breast feed on demand     Met with mother and father  Provided mother with Ready, Set, Baby booklet  Discussed Skin to Skin contact an benefits to mom and baby  Talked about the delay of the first bath until baby has adjusted  Spoke about the benefits of rooming in  Feeding on cue and what that means for recognizing infant's hunger   Avoidance of pacifiers for the first month discussed  Talked about exclusive breastfeeding for the first 6 months  Positioning and latch reviewed as well as showing images of other feeding positions  Adjusted mom's positioning and guided to deeper latch using cross cradle then cradle for comfort  Mom noted less nipple tenderness and better suckling by baby  Discussed the properties of a good latch in any position  Reviewed hand/manual expression  Discussed s/s that baby is getting enough milk and some s/s that breastfeeding dyad may need further help  Gave information on common concerns, what to expect the first few weeks after delivery, preparing for other caregivers, and how partners can help  Resources for support also provided  Dad at bedside  [employer] MOB  to call for assistance, questions and concerns  Extension number for inpatient lactation support provided      Jodi Julio RN 8/7/2020 10:29 AM

## 2020-08-07 NOTE — PLAN OF CARE
Problem: POSTPARTUM  Goal: Experiences normal postpartum course  Description: INTERVENTIONS:  - Monitor maternal vital signs  - Assess uterine involution and lochia  Outcome: Progressing  Goal: Appropriate maternal -  bonding  Description: INTERVENTIONS:  - Identify family support  - Assess for appropriate maternal/infant bonding   -Encourage maternal/infant bonding opportunities  - Referral to  or  as needed  2020 1446 by Michael Andre RN  Outcome: Todd Lafleur  2020 1431 by Michael Andre RN  Outcome: Not Progressing  Goal: Establishment of infant feeding pattern  Description: INTERVENTIONS:  - Assess breast/bottle feeding  - Refer to lactation as needed  2020 1446 by Michael Andre RN  Outcome: Progressing  2020 1431 by Michael Andre RN  Outcome: Adequate for Discharge  Goal: Incision(s), wounds(s) or drain site(s) healing without S/S of infection  Description: INTERVENTIONS  - Assess and document risk factors for skin impairment   - Assess and document dressing, incision, wound bed, drain sites and surrounding tissue  - Consider nutrition services referral as needed  - Oral mucous membranes remain intact  - Provide patient/ family education  Outcome: Progressing     Problem: PAIN - ADULT  Goal: Verbalizes/displays adequate comfort level or baseline comfort level  Description: Interventions:  - Encourage patient to monitor pain and request assistance  - Assess pain using appropriate pain scale  - Administer analgesics based on type and severity of pain and evaluate response  - Implement non-pharmacological measures as appropriate and evaluate response  - Consider cultural and social influences on pain and pain management  - Notify physician/advanced practitioner if interventions unsuccessful or patient reports new pain  Outcome: Progressing     Problem: INFECTION - ADULT  Goal: Absence or prevention of progression during hospitalization  Description: INTERVENTIONS:  - Assess and monitor for signs and symptoms of infection  - Monitor lab/diagnostic results  - Monitor all insertion sites, i e  indwelling lines, tubes, and drains  - Monitor endotracheal if appropriate and nasal secretions for changes in amount and color  - [place] appropriate cooling/warming therapies per order  - Administer medications as ordered  - Instruct and encourage patient and family to use good hand hygiene technique  - Identify and instruct in appropriate isolation precautions for identified infection/condition  Outcome: Progressing  Goal: Absence of fever/infection during neutropenic period  Description: INTERVENTIONS:  - Monitor WBC    Outcome: Progressing     Problem: SAFETY ADULT  Goal: Patient will remain free of falls  Description: INTERVENTIONS:  - Assess patient frequently for physical needs  -  Identify cognitive and physical deficits and behaviors that affect risk of falls    -  [place] fall precautions as indicated by assessment   - Educate patient/family on patient safety including physical limitations  - Instruct patient to call for assistance with activity based on assessment  - Modify environment to reduce risk of injury  - Consider OT/PT consult to assist with strengthening/mobility  Outcome: Progressing  Goal: Maintain or return to baseline ADL function  Description: INTERVENTIONS:  -  Assess patient's ability to carry out ADLs; assess patient's baseline for ADL function and identify physical deficits which impact ability to perform ADLs (bathing, care of mouth/teeth, toileting, grooming, dressing, etc )  - Assess/evaluate cause of self-care deficits   - Assess range of motion  - Assess patient's mobility; develop plan if impaired  - Assess patient's need for assistive devices and provide as appropriate  - Encourage maximum independence but intervene and supervise when necessary  - Involve family in performance of ADLs  - Assess for home care needs following discharge - Consider OT consult to assist with ADL evaluation and planning for discharge  - Provide patient education as appropriate  Outcome: Progressing  Goal: Maintain or return mobility status to optimal level  Description: INTERVENTIONS:  - Assess patient's baseline mobility status (ambulation, transfers, stairs, etc )    - Identify cognitive and physical deficits and behaviors that affect mobility  - Identify mobility aids required to assist with transfers and/or ambulation (gait belt, sit-to-stand, lift, walker, cane, etc )  - [place] fall precautions as indicated by assessment  - Record patient progress and toleration of activity level on Mobility SBAR; progress patient to next Phase/Stage  - Instruct patient to call for assistance with activity based on assessment  - Consider rehabilitation consult to assist with strengthening/weightbearing, etc   Outcome: Progressing     Problem: Knowledge Deficit  Goal: Patient/family/caregiver demonstrates understanding of disease process, treatment plan, medications, and discharge instructions  Description: Complete learning assessment and assess knowledge base    Interventions:  - Provide teaching at level of understanding  - Provide teaching via preferred learning methods  Outcome: Progressing     Problem: DISCHARGE PLANNING  Goal: Discharge to home or other facility with appropriate resources  Description: INTERVENTIONS:  - Identify barriers to discharge w/patient and caregiver  - Arrange for needed discharge resources and transportation as appropriate  - Identify discharge learning needs (meds, wound care, etc )  - Arrange for interpretive services to assist at discharge as needed  - Refer to Case Management Department for coordinating discharge planning if the patient needs post-hospital services based on physician/advanced practitioner order or complex needs related to functional status, cognitive ability, or social support system  Outcome: Progressing

## 2020-08-07 NOTE — PROGRESS NOTES
Progress Note - OB/GYN   Boni Soliz 23 y o  female MRN: [id number]  Unit/Bed#: L&D 312-01 Encounter: [id number]    Assessment:  Post partum Day #1 s/p , stable, baby in room    Plan:  1) THC use in pregnancy  - UDS negative upon admission  - CM consult pending  2) Continue routine post partum care   Encourage ambulation   Encourage breastfeeding   Anticipate discharge pending CM consult     Subjective/Objective   Chief Complaint:     Post delivery  Patient is doing well  Lochia WNL  Pain well controlled  Breastfeeding  No complaints    Subjective:     Pain: yes, cramping, improved with meds  Tolerating PO: yes  Voiding: yes  Flatus: yes  BM: no  Ambulating: yes  Chest pain: no  Shortness of breath: no  Leg pain: no  Lochia: minimal    Objective:     Vitals: /81 (BP Location: Left arm)   Pulse 89   Temp 98 7 °F (37 1 °C) (Oral)   Resp 16   Ht 5' 4" (1 626 m)   Wt 74 8 kg (165 lb)   LMP  (LMP Unknown)   SpO2 100%   Breastfeeding Yes   BMI 28 32 kg/m²       Intake/Output Summary (Last 24 hours) at 2020 0642  Last data filed at 2020 2101  Gross per 24 hour   Intake --   Output 1911 ml   Net -1911 ml       Lab Results   Component Value Date    WBC 10 39 (H) 2020    HGB 11 1 (L) 2020    HCT 33 7 (L) 2020    MCV 87 2020     2020       Physical Exam:     Gen: AAOx3, NAD  CV: RRR  Lungs: CTA b/l  Abd: Soft, non-tender, non-distended, no rebound or guarding  Uterine fundus firm and non-tender, 1 cm below the umbilicus     Ext: Non tender    Chauncey Feliz MD   OB/GYN PGY-1  2020  6:42 AM

## 2020-08-07 NOTE — NURSING NOTE
Discharge teaching completed with mother and father of baby  All questions reviewed and answered      Maureen Mann RN

## 2020-08-07 NOTE — ANESTHESIA POSTPROCEDURE EVALUATION
Post-Op Assessment Note    CV Status:  Stable    Pain management: adequate     Mental Status:  Alert and awake   Hydration Status:  Euvolemic   PONV Controlled:  Controlled   Airway Patency:  Patent      Post Op Vitals Reviewed: Yes        Post-op block assessment: catheter intact and no complications      No complications documented      BP      Temp      Pulse     Resp      SpO2

## 2020-08-08 VITALS
RESPIRATION RATE: 18 BRPM | TEMPERATURE: 98.8 F | WEIGHT: 165 LBS | HEIGHT: 64 IN | OXYGEN SATURATION: 100 % | SYSTOLIC BLOOD PRESSURE: 114 MMHG | DIASTOLIC BLOOD PRESSURE: 87 MMHG | HEART RATE: 67 BPM | BODY MASS INDEX: 28.17 KG/M2

## 2020-08-08 PROCEDURE — 99024 POSTOP FOLLOW-UP VISIT: CPT | Performed by: OBSTETRICS & GYNECOLOGY

## 2020-08-08 RX ORDER — MAGNESIUM HYDROXIDE/ALUMINUM HYDROXICE/SIMETHICONE 120; 1200; 1200 MG/30ML; MG/30ML; MG/30ML
15 SUSPENSION ORAL EVERY 6 HOURS PRN
Qty: 355 ML | Refills: 0
Start: 2020-08-08

## 2020-08-08 RX ORDER — ACETAMINOPHEN 325 MG/1
650 TABLET ORAL EVERY 6 HOURS PRN
Qty: 30 TABLET | Refills: 0
Start: 2020-08-08

## 2020-08-08 RX ORDER — DOCUSATE SODIUM 100 MG/1
100 CAPSULE, LIQUID FILLED ORAL 2 TIMES DAILY
Qty: 10 CAPSULE | Refills: 0
Start: 2020-08-08

## 2020-08-08 RX ORDER — IBUPROFEN 600 MG/1
600 TABLET ORAL EVERY 6 HOURS PRN
Qty: 30 TABLET | Refills: 0
Start: 2020-08-08

## 2020-08-08 RX ORDER — SIMETHICONE 80 MG
80 TABLET,CHEWABLE ORAL 4 TIMES DAILY PRN
Qty: 30 TABLET | Refills: 0
Start: 2020-08-08

## 2020-08-08 RX ADMIN — DOCUSATE SODIUM 100 MG: 100 CAPSULE, LIQUID FILLED ORAL at 09:43

## 2020-08-08 NOTE — PLAN OF CARE
Problem: POSTPARTUM  Goal: Experiences normal postpartum course  Description: INTERVENTIONS:  - Monitor maternal vital signs  - Assess uterine involution and lochia  Outcome: Progressing  Goal: Appropriate maternal -  bonding  Description: INTERVENTIONS:  - Identify family support  - Assess for appropriate maternal/infant bonding   -Encourage maternal/infant bonding opportunities  - Referral to  or  as needed  Outcome: Progressing  Goal: Establishment of infant feeding pattern  Description: INTERVENTIONS:  - Assess breast/bottle feeding  - Refer to lactation as needed  Outcome: Progressing  Goal: Incision(s), wounds(s) or drain site(s) healing without S/S of infection  Description: INTERVENTIONS  - Assess and document risk factors for skin impairment   - Assess and document dressing, incision, wound bed, drain sites and surrounding tissue  - Consider nutrition services referral as needed  - Oral mucous membranes remain intact  - Provide patient/ family education  Outcome: Progressing     Problem: PAIN - ADULT  Goal: Verbalizes/displays adequate comfort level or baseline comfort level  Description: Interventions:  - Encourage patient to monitor pain and request assistance  - Assess pain using appropriate pain scale  - Administer analgesics based on type and severity of pain and evaluate response  - Implement non-pharmacological measures as appropriate and evaluate response  - Consider cultural and social influences on pain and pain management  - Notify physician/advanced practitioner if interventions unsuccessful or patient reports new pain  Outcome: Progressing     Problem: INFECTION - ADULT  Goal: Absence or prevention of progression during hospitalization  Description: INTERVENTIONS:  - Assess and monitor for signs and symptoms of infection  - Monitor lab/diagnostic results  - Monitor all insertion sites, i e  indwelling lines, tubes, and drains  - Monitor endotracheal if appropriate and nasal secretions for changes in amount and color  - [place] appropriate cooling/warming therapies per order  - Administer medications as ordered  - Instruct and encourage patient and family to use good hand hygiene technique  - Identify and instruct in appropriate isolation precautions for identified infection/condition  Outcome: Progressing  Goal: Absence of fever/infection during neutropenic period  Description: INTERVENTIONS:  - Monitor WBC    Outcome: Progressing     Problem: SAFETY ADULT  Goal: Patient will remain free of falls  Description: INTERVENTIONS:  - Assess patient frequently for physical needs  -  Identify cognitive and physical deficits and behaviors that affect risk of falls    -  [place] fall precautions as indicated by assessment   - Educate patient/family on patient safety including physical limitations  - Instruct patient to call for assistance with activity based on assessment  - Modify environment to reduce risk of injury  - Consider OT/PT consult to assist with strengthening/mobility  Outcome: Progressing  Goal: Maintain or return to baseline ADL function  Description: INTERVENTIONS:  -  Assess patient's ability to carry out ADLs; assess patient's baseline for ADL function and identify physical deficits which impact ability to perform ADLs (bathing, care of mouth/teeth, toileting, grooming, dressing, etc )  - Assess/evaluate cause of self-care deficits   - Assess range of motion  - Assess patient's mobility; develop plan if impaired  - Assess patient's need for assistive devices and provide as appropriate  - Encourage maximum independence but intervene and supervise when necessary  - Involve family in performance of ADLs  - Assess for home care needs following discharge   - Consider OT consult to assist with ADL evaluation and planning for discharge  - Provide patient education as appropriate  Outcome: Progressing  Goal: Maintain or return mobility status to optimal level  Description: INTERVENTIONS:  - Assess patient's baseline mobility status (ambulation, transfers, stairs, etc )    - Identify cognitive and physical deficits and behaviors that affect mobility  - Identify mobility aids required to assist with transfers and/or ambulation (gait belt, sit-to-stand, lift, walker, cane, etc )  - [place] fall precautions as indicated by assessment  - Record patient progress and toleration of activity level on Mobility SBAR; progress patient to next Phase/Stage  - Instruct patient to call for assistance with activity based on assessment  - Consider rehabilitation consult to assist with strengthening/weightbearing, etc   Outcome: Progressing     Problem: Knowledge Deficit  Goal: Patient/family/caregiver demonstrates understanding of disease process, treatment plan, medications, and discharge instructions  Description: Complete learning assessment and assess knowledge base    Interventions:  - Provide teaching at level of understanding  - Provide teaching via preferred learning methods  Outcome: Progressing     Problem: DISCHARGE PLANNING  Goal: Discharge to home or other facility with appropriate resources  Description: INTERVENTIONS:  - Identify barriers to discharge w/patient and caregiver  - Arrange for needed discharge resources and transportation as appropriate  - Identify discharge learning needs (meds, wound care, etc )  - Arrange for interpretive services to assist at discharge as needed  - Refer to Case Management Department for coordinating discharge planning if the patient needs post-hospital services based on physician/advanced practitioner order or complex needs related to functional status, cognitive ability, or social support system  Outcome: Progressing

## 2020-08-08 NOTE — PLAN OF CARE
Problem: POSTPARTUM  Goal: Experiences normal postpartum course  Description: INTERVENTIONS:  - Monitor maternal vital signs  - Assess uterine involution and lochia  Outcome: Adequate for Discharge  Goal: Appropriate maternal -  bonding  Description: INTERVENTIONS:  - Identify family support  - Assess for appropriate maternal/infant bonding   -Encourage maternal/infant bonding opportunities  - Referral to  or  as needed  Outcome: Adequate for Discharge  Goal: Establishment of infant feeding pattern  Description: INTERVENTIONS:  - Assess breast/bottle feeding  - Refer to lactation as needed  Outcome: Adequate for Discharge  Goal: Incision(s), wounds(s) or drain site(s) healing without S/S of infection  Description: INTERVENTIONS  - Assess and document risk factors for skin impairment   - Assess and document dressing, incision, wound bed, drain sites and surrounding tissue  - Consider nutrition services referral as needed  - Oral mucous membranes remain intact  - Provide patient/ family education  Outcome: Adequate for Discharge     Problem: PAIN - ADULT  Goal: Verbalizes/displays adequate comfort level or baseline comfort level  Description: Interventions:  - Encourage patient to monitor pain and request assistance  - Assess pain using appropriate pain scale  - Administer analgesics based on type and severity of pain and evaluate response  - Implement non-pharmacological measures as appropriate and evaluate response  - Consider cultural and social influences on pain and pain management  - Notify physician/advanced practitioner if interventions unsuccessful or patient reports new pain  Outcome: Adequate for Discharge     Problem: INFECTION - ADULT  Goal: Absence or prevention of progression during hospitalization  Description: INTERVENTIONS:  - Assess and monitor for signs and symptoms of infection  - Monitor lab/diagnostic results  - Monitor all insertion sites, i e  indwelling lines, tubes, and drains  - Monitor endotracheal if appropriate and nasal secretions for changes in amount and color  - [place] appropriate cooling/warming therapies per order  - Administer medications as ordered  - Instruct and encourage patient and family to use good hand hygiene technique  - Identify and instruct in appropriate isolation precautions for identified infection/condition  Outcome: Adequate for Discharge  Goal: Absence of fever/infection during neutropenic period  Description: INTERVENTIONS:  - Monitor WBC    Outcome: Adequate for Discharge     Problem: SAFETY ADULT  Goal: Patient will remain free of falls  Description: INTERVENTIONS:  - Assess patient frequently for physical needs  -  Identify cognitive and physical deficits and behaviors that affect risk of falls    -  [place] fall precautions as indicated by assessment   - Educate patient/family on patient safety including physical limitations  - Instruct patient to call for assistance with activity based on assessment  - Modify environment to reduce risk of injury  - Consider OT/PT consult to assist with strengthening/mobility  Outcome: Adequate for Discharge  Goal: Maintain or return to baseline ADL function  Description: INTERVENTIONS:  -  Assess patient's ability to carry out ADLs; assess patient's baseline for ADL function and identify physical deficits which impact ability to perform ADLs (bathing, care of mouth/teeth, toileting, grooming, dressing, etc )  - Assess/evaluate cause of self-care deficits   - Assess range of motion  - Assess patient's mobility; develop plan if impaired  - Assess patient's need for assistive devices and provide as appropriate  - Encourage maximum independence but intervene and supervise when necessary  - Involve family in performance of ADLs  - Assess for home care needs following discharge   - Consider OT consult to assist with ADL evaluation and planning for discharge  - Provide patient education as appropriate  Outcome: Adequate for Discharge  Goal: Maintain or return mobility status to optimal level  Description: INTERVENTIONS:  - Assess patient's baseline mobility status (ambulation, transfers, stairs, etc )    - Identify cognitive and physical deficits and behaviors that affect mobility  - Identify mobility aids required to assist with transfers and/or ambulation (gait belt, sit-to-stand, lift, walker, cane, etc )  - [place] fall precautions as indicated by assessment  - Record patient progress and toleration of activity level on Mobility SBAR; progress patient to next Phase/Stage  - Instruct patient to call for assistance with activity based on assessment  - Consider rehabilitation consult to assist with strengthening/weightbearing, etc   Outcome: Adequate for Discharge     Problem: Knowledge Deficit  Goal: Patient/family/caregiver demonstrates understanding of disease process, treatment plan, medications, and discharge instructions  Description: Complete learning assessment and assess knowledge base    Interventions:  - Provide teaching at level of understanding  - Provide teaching via preferred learning methods  Outcome: Adequate for Discharge     Problem: DISCHARGE PLANNING  Goal: Discharge to home or other facility with appropriate resources  Description: INTERVENTIONS:  - Identify barriers to discharge w/patient and caregiver  - Arrange for needed discharge resources and transportation as appropriate  - Identify discharge learning needs (meds, wound care, etc )  - Arrange for interpretive services to assist at discharge as needed  - Refer to Case Management Department for coordinating discharge planning if the patient needs post-hospital services based on physician/advanced practitioner order or complex needs related to functional status, cognitive ability, or social support system  Outcome: Adequate for Discharge

## 2020-08-08 NOTE — DISCHARGE SUMMARY
Discharge Summary - Cora Castro 23 y o  female MRN: [id number]    Unit/Bed#: L&D 312-01 Encounter: [id number]    Admission Date: 2020     Discharge Date: 2020    Admitting Diagnosis:   Patient Active Problem List   Diagnosis    Dysmenorrhea    Migraine    Anxiety    Third trimester pregnancy    GBS carrier    GBS bacteriuria    39 weeks gestation of pregnancy    Currently pregnant    Anemia     (spontaneous vaginal delivery)       Discharge Diagnosis:   Same, delivered    Procedures:   spontaneous vaginal delivery    Admitting Attending: Dr Merle Pereira  Delivery Attending: Dr Jesi Wooten  Discharge Attending: [facility] Course:     Cora Castro is a 23 y o   who was admitted in labor  She received pitocin for augmentation and amniotomy for augmentation  She received an epidural for analgesia and     She then underwent an uncomplicated spontaneous vaginal delivery and delivered a viable male  at 5  APGARS were 8, 9 at 1 and 5 minutes, respectively   weighed 8lb 4 1oz   was then transferred to  nursery  Patient tolerated the procedure well and was transferred to postpartum in stable condition  The patient's post partum course was unremarkable  On day of discharge, she was ambulating and able to reasonably perform all ADLs  She was voiding and had appropriate bowel function  Pain was well controlled  She was discharged home on postpartum day #2 without complications  Patient was instructed to follow up with her OB as an outpatient and was given appropriate warnings to call provider if she develops signs of infection or uncontrolled pain  Condition at discharge:   good     Disposition:   Home    Planned Readmission:   No    Discharge Medications:   Please see after visit summary for full list of discharge medications      Zaheer Torres MD, PGY-3  2020  6:30 AM

## 2020-08-08 NOTE — PROGRESS NOTES
Progress Note - OB/GYN   Chaitanya Matos 23 y o  female MRN: [id number]  Unit/Bed#: L&D 312-01 Encounter: [id number]    Assessment:  Post partum Day # 2 s/p , stable, baby in room    Plan:  1) Continue routine post partum care   Encourage ambulation   Encourage breastfeeding   Anticipate discharge today     Subjective/Objective   Chief Complaint:     Post delivery  Patient is doing well  Lochia WNL  Pain well controlled  Subjective:     Pain: yes, cramping, improved with meds  Tolerating PO: yes  Voiding: yes  Flatus: yes  BM: no  Ambulating: yes  Breastfeeding:  yes  Chest pain: no  Shortness of breath: no  Leg pain: no  Lochia: minimal    Objective:     Vitals: /93 (BP Location: Right arm)   Pulse 72   Temp 98 6 °F (37 °C) (Oral)   Resp 16   Ht 5' 4" (1 626 m)   Wt 74 8 kg (165 lb)   LMP  (LMP Unknown)   SpO2 100%   Breastfeeding Yes   BMI 28 32 kg/m²     No intake or output data in the 24 hours ending 20    Lab Results   Component Value Date    WBC 10 39 (H) 2020    HGB 11 1 (L) 2020    HCT 33 7 (L) 2020    MCV 87 2020     2020       Physical Exam:     Gen: AAOx3, NAD  CV: RRR  Lungs: CTA b/l  Abd: Soft, non-tender, non-distended, no rebound or guarding  Uterine fundus firm and non-tender, -2 cm below the umbilicus     Ext: Non tender, Negative Rangel's sign bilaterally    Gina Miles MD  2020  6:28 AM

## 2020-08-08 NOTE — LACTATION NOTE
This note was copied from a baby's chart  Met with mother to go over discharge breastfeeding booklet including the feeding log  Emphasized 8 or more (12) feedings in a 24 hour period, what to expect for the number of diapers per day of life and the progression of properties of the  stooling pattern  Reviewed breastfeeding and your lifestyle, storage and preparation of breast milk, how to keep you breast pump clean, the employed breastfeeding mother and paced bottle feeding handouts  Booklet included Breastfeeding Resources for after discharge including access to the number for the [address]  Mother verbalized breastfeeding is going well  Enc to call for assistance as needed,phone # given

## 2020-08-10 NOTE — UTILIZATION REVIEW
Notification of Maternity/Delivery & [facility] Birth Information for Admission   Notification of Maternity/Delivery for Admission to our facility [facility] [address]  Be advised that this patient was admitted to our facility under Inpatient Status  Contact Bianca Luna at [phone] for additional admission information  [facility] DEPT  DEDICATED [phone]  Mother &  Information   Patient Name: Delmer Camara YOB: 1995   Delivering clinician: [facility]   OB History        1    Para   1    Term   1            AB        Living   1       SAB        TAB        Ectopic        Multiple   0    Live Births   1                Name & MRN:   Information for the patient's :  Delroy Dipak [[id number]]     [place] Delivery Information:  Sex: male  Delivered 2020 6:42 PM by Vaginal, Spontaneous; Gestational Age: [address]     Measurements:  Weight: 8 lb 4 1 oz (3745 g); Height: 21"    APGAR 1 minute 5 minutes 10 minutes   Totals: 8 9      [place] Birth Information: 23 y o  female MRN: [id number] Unit/Bed#: L&D 312-01 Estimated Date of Delivery: 8/3/20  Birthweight: No birth weight on file   Gestational Age: <None> Delivery Type: Vaginal, Spontaneous          APGARS  One minute Five minutes Ten minutes   Totals:                 [address]   [address]:   Alejo Mcclelland  Tax ID: [id number]  NPI: [id number] Attending Provider/NPI:  Phone:  [facility] [[id number][facility]  [phone]  Same as ROBBIE/Malathi Garber 1106 of Service Code: 24 Place of Service Name: [address] [place] [address]   Start Date: 206   Discharge Date & Time: 2020 11:30 AM    Type of Admission: Inpatient Status Discharge Disposition (if discharged): Home/Self Care   Patient Diagnoses:   Abdominal pain affecting pregnancy, antepartum [O26 899, R10 9]  The primary encounter diagnosis was  (spontaneous vaginal delivery)  Diagnoses of Third trimester pregnancy and Postpartum care and examination of lactating mother were also pertinent to this visit  1   (spontaneous vaginal delivery)    2  Third trimester pregnancy    3  Postpartum care and examination of lactating mother       Orders: Admission Orders (From admission, onward)     Ordered        20 0326  Inpatient Admission  Once                    Assigned Utilization Review Contact: Adele Garrido Utilization Review Department  Phone: [phone]; Fax [phone]  Email: [email or web address]

## 2020-08-14 LAB — PLACENTA IN STORAGE: NORMAL

## 2020-08-27 ENCOUNTER — OFFICE VISIT (OUTPATIENT)
Dept: OBGYN CLINIC | Facility: MEDICAL CENTER | Age: 25
End: 2020-08-27
Payer: COMMERCIAL

## 2020-08-27 VITALS
SYSTOLIC BLOOD PRESSURE: 110 MMHG | DIASTOLIC BLOOD PRESSURE: 74 MMHG | TEMPERATURE: 97.3 F | BODY MASS INDEX: 24.2 KG/M2 | WEIGHT: 141 LBS

## 2020-08-27 DIAGNOSIS — N64.4 BREAST PAIN, RIGHT: Primary | ICD-10-CM

## 2020-08-27 PROCEDURE — 99213 OFFICE O/P EST LOW 20 MIN: CPT | Performed by: NURSE PRACTITIONER

## 2020-08-27 NOTE — PROGRESS NOTES
Assessment:  Pt given info to contact lactation consultant at baby and 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  Sx started yeasterday  She wants to be checked to be sure she does not have a nipple infection  Afebrile, no body aches, and pain when she is squeezing the nipple  But still able to pump  She delivered  a boy joel  Sees dr Woo Wilks for peds                 Patient Active Problem List   Diagnosis    Dysmenorrhea    Migraine    Anxiety    Third trimester pregnancy    GBS carrier    GBS bacteriuria    44 weeks gestation of pregnancy    Currently pregnant    Anemia     (spontaneous vaginal delivery)       Past Medical History:   Diagnosis Date    Anemia 2020    Migraine     Urinary tract infection     frequent UTI in past    Varicella        Past Surgical History:   Procedure Laterality Date    BREAST SURGERY      breast augemtation    TOE SURGERY         Family History   Problem Relation Age of Onset    Migraines Mother     Coronary artery disease Father     Coronary artery disease Paternal Grandmother     No Known Problems Brother     No Known Problems Maternal Grandmother     Diabetes Maternal Grandfather     Heart disease Paternal Grandfather        Social History     Socioeconomic History    Marital status: /Civil Union     Spouse name: Not on file    Number of children: Not on file    Years of education: Not on file    Highest education level: Not on file   Occupational History    Occupation: student   Social Needs    Financial resource strain: Not on file    Food insecurity     Worry: Not on file     Inability: Not on file    Transportation needs     Medical: Not on file     Non-medical: Not on file   Tobacco Use    Smoking status: Never Smoker    Smokeless tobacco: Never Used   Substance and Sexual Activity    Alcohol use: Not Currently     Comment: socially prior to pregnancy    Drug use: Never    Sexual activity: Yes   Lifestyle    Physical activity     Days per week: Not on file     Minutes per session: Not on file    Stress: Not on file   Relationships    Social connections     Talks on phone: Not on file     Gets together: Not on file     Attends Yazidism service: Not on file     Active member of club or organization: Not on file     Attends meetings of clubs or organizations: Not on file     Relationship status: Not on file    Intimate partner violence     Fear of current or ex partner: Not on file     Emotionally abused: Not on file     Physically abused: Not on file     Forced sexual activity: Not on file   Other Topics Concern    Not on file   Social History Narrative    Not on file         Current Outpatient Medications:     Prenatal Vit-Fe Fumarate-FA (PRENATAL 1+1 PO), Take 1 tablet by mouth daily, Disp: , Rfl:     acetaminophen (TYLENOL) 325 mg tablet, Take 2 tablets (650 mg total) by mouth every 6 (six) hours as needed for mild pain (Patient not taking: Reported on 8/27/2020), Disp: 30 tablet, Rfl: 0    aluminum-magnesium hydroxide-simethicone (MYLANTA) 200-200-20 mg/5 mL suspension, Take 15 mL by mouth every 6 (six) hours as needed for indigestion or heartburn (unresponsive to 1st line TX (Tums)) (Patient not taking: Reported on 8/27/2020), Disp: 355 mL, Rfl: 0    benzocaine-menthol-lanolin-aloe (DERMOPLAST) 20-0 5 % topical spray, Apply 1 application topically 4 (four) times a day as needed for irritation (Patient not taking: Reported on 8/27/2020), Disp: , Rfl: 0    docusate sodium (COLACE) 100 mg capsule, Take 1 capsule (100 mg total) by mouth 2 (two) times a day (Patient not taking: Reported on 8/27/2020), Disp: 10 capsule, Rfl: 0    ibuprofen (MOTRIN) 600 mg tablet, Take 1 tablet (600 mg total) by mouth every 6 (six) hours as needed (cramping) (Patient not taking: Reported on 8/27/2020), Disp: 30 tablet, Rfl: 0    simethicone (MYLICON) 80 mg chewable tablet, Chew 1 tablet (80 mg total) 4 (four) times a day as needed for flatulence (Patient not taking: Reported on 8/27/2020), Disp: 30 tablet, Rfl: 0    witch hazel-glycerin (TUCKS) topical pad, Apply 1 pad topically every 2 (two) hours as needed for irritation (Patient not taking: Reported on 8/27/2020), Disp: , Rfl: 0    Allergies   Allergen Reactions    Penicillins Hives     Tolerates cephalosporins       Review of Systems  Constitutional :no fever, feels well, no tiredness, no recent weight gain or loss  ENT: no ear ache, no loss of hearing, no nosebleeds or nasal discharge, no sore throat or hoarseness  Cardiovascular: no complaints of slow or fast heart beat, no chest pain, no palpitations, no leg claudication or lower extremity edema  Respiratory: no complaints of shortness of shortness of breath, no MITCHELL  Breasts:no complaints of breast pain, breast lump, or nipple discharge  Gastrointestinal: no complaints of abdominal pain, constipation, nausea, vomiting, or diarrhea or bloody stools  Genitourinary : no complaints of dysuria, incontinence, pelvic pain, no dysmenorrhea, vaginal discharge or abnormal vaginal bleeding and as noted in HPI  Musculoskeletal: no complaints of arthralgia, no myalgia, no joint swelling or      stiffness, no limb pain or swelling  Integumentary: no complaints of skin rash or lesion, itching or dry skin  Neurological: no complaints of headache, no confusion, no numbness or tingling, no dizziness or fainting    Constitutional   General appearance: No acute distress, well appearing and well nourished      Psychiatric   Orientation to person, place, and time: Normal     Mood and affect: Normal     Breast  Breasts: breasts appear normal, no suspicious masses, no warmth or exudate  Some redness around areola  but no signs of infection Using  no skin or nipple changes or axillary nodes

## 2020-09-02 ENCOUNTER — POSTPARTUM VISIT (OUTPATIENT)
Dept: OBGYN CLINIC | Facility: CLINIC | Age: 25
End: 2020-09-02

## 2020-09-02 VITALS
WEIGHT: 140 LBS | BODY MASS INDEX: 23.9 KG/M2 | SYSTOLIC BLOOD PRESSURE: 100 MMHG | HEIGHT: 64 IN | DIASTOLIC BLOOD PRESSURE: 60 MMHG

## 2020-09-02 PROCEDURE — 99024 POSTOP FOLLOW-UP VISIT: CPT | Performed by: OBSTETRICS & GYNECOLOGY

## 2020-09-02 NOTE — PROGRESS NOTES
OB POSTPARTUM VISIT PROGRESS NOTE  Date of Encounter: 2020    Heidy Whaley    : 1995  (23 y o )  MR: [id number]    Subjective   Colby Hollis is in for her postpartum visit  She is now   She delivered by normal spontaneous vaginal delivery  She delivered a Male   Patient was delivered by me  She's generally doing well, denies current pain or bleeding issues, and has no significant depression issues  She is breast feeding exclusively  History of diabetes in pregnancy No  Complications in pregnancy: No   We discussed all appropriate contraceptive options and she chooses desires oCP but not yet     Objective     /60   Ht 5' 4" (1 626 m)   Wt 63 5 kg (140 lb)   LMP  (LMP Unknown)   Breastfeeding Yes   BMI 24 03 kg/m²     General:   appears stated age, cooperative, alert normal mood and affect   Lungs: Unlabored breathing    Breasts: normal appearance, no masses or tenderness   Abdomen: soft, non-tender, without masses or organomegaly  Right larger than left    Vulva: normal   Vagina: well approximated / stitches present and intact   Urethra: normal   Cervix: not evaluated    Uterus: not examined   Adnexa: not evaluated     Assessment/Plan   There are no diagnoses linked to this encounter  23 y o  y/o  now 4 weeks post   doing well  2  Last pap smear: needed   3  Contraception: desires OCP   4  RTO in 2-3 year for annual visit  5  Baby and Me resources provided if needed  6  Baby: Doing well and meeting all milestones accordingly as per patient from the visit to the pediatrician     7  EPDS: 10    Katherine Sheriff MD

## 2020-09-16 ENCOUNTER — POSTPARTUM VISIT (OUTPATIENT)
Dept: OBGYN CLINIC | Facility: CLINIC | Age: 25
End: 2020-09-16

## 2020-09-16 VITALS
BODY MASS INDEX: 23.56 KG/M2 | WEIGHT: 138 LBS | DIASTOLIC BLOOD PRESSURE: 60 MMHG | HEIGHT: 64 IN | SYSTOLIC BLOOD PRESSURE: 135 MMHG

## 2020-09-16 PROCEDURE — 99024 POSTOP FOLLOW-UP VISIT: CPT | Performed by: OBSTETRICS & GYNECOLOGY

## 2020-09-16 NOTE — PROGRESS NOTES
OB POSTPARTUM VISIT PROGRESS NOTE  Date of Encounter: 2020    Tyson Epping    : 1995  (23 y o )  MR: [id number]    Bonny Menchaca is in for her postpartum visit  She is now   She delivered by normal spontaneous vaginal delivery  She delivered a Female   Patient was delivered by me   She's generally doing well, denies current pain or bleeding issues, and has no significant depression issues  She is breast feeding with some supplementation  History of diabetes in pregnancy No  Complications in pregnancy: no    We discussed all appropriate contraceptive options and she chooses Nexplanon     Objective     /60   Ht 5' 4" (1 626 m)   Wt 62 6 kg (138 lb)   LMP 2020 (Exact Date)   BMI 23 69 kg/m²     General:   appears stated age, cooperative, alert normal mood and affect   Abdomen: soft, non-tender, without masses or organomegaly   Vulva: normal   Vagina: normal vagina, no discharge, exudate, lesion, or erythema   Urethra: normal   Cervix: Normal, no discharge  Nontender  Uterus: normal size, contour, position, consistency, mobility, non-tender   Adnexa: no mass, fullness, tenderness     Assessment/Plan   There are no diagnoses linked to this encounter  23 y o  y/o  now 6 weeks post  doing well  2  Last pap smear: oneyear ago no record in chart   3  Contraception: : desires nexplanon - risks , benefits and alternatives discussed   4  RTO in 2-3 weeks for nexplanon placement and yearly    5  Baby and Me resources provided if needed  6  Baby: Doing well and meeting all milestones accordingly as per patient from the visit to the pediatrician     7  EPDS: 6    Neptali Crowley MD

## 2020-09-18 ENCOUNTER — TELEPHONE (OUTPATIENT)
Dept: OBGYN CLINIC | Facility: MEDICAL CENTER | Age: 25
End: 2020-09-18

## 2020-09-18 NOTE — TELEPHONE ENCOUNTER
----- Message from Johnathon Lee sent at 9/18/2020 10:38 AM EDT -----  Regarding: RE: nexplanon  I contacted pt's insurance  Effective 1/1/20 and active  Pt is covered for insertion, removal and device at 100%  No PA needed  Negretedominga Eganey Ref# [id number]   ----- Message -----  From: Bear Serafin  Sent: 9/16/2020  10:33 AM EDT  To: Johnathon Lee  Subject: nexplanon                                        Please get prior auth for nexplanon, schedule with yearly   with Dr Pablo Huerta

## 2020-09-19 ENCOUNTER — TELEPHONE (OUTPATIENT)
Dept: OTHER | Facility: OTHER | Age: 25
End: 2020-09-19

## 2020-09-19 DIAGNOSIS — N61.0 ACUTE MASTITIS OF RIGHT BREAST: Primary | ICD-10-CM

## 2020-09-19 RX ORDER — AZITHROMYCIN 250 MG/1
TABLET, FILM COATED ORAL
Qty: 6 TABLET | Refills: 0 | Status: SHIPPED | OUTPATIENT
Start: 2020-09-19 | End: 2020-09-23

## 2020-10-02 ENCOUNTER — ANNUAL EXAM (OUTPATIENT)
Dept: OBGYN CLINIC | Facility: CLINIC | Age: 25
End: 2020-10-02
Payer: COMMERCIAL

## 2020-10-02 VITALS — SYSTOLIC BLOOD PRESSURE: 110 MMHG | BODY MASS INDEX: 23.89 KG/M2 | DIASTOLIC BLOOD PRESSURE: 75 MMHG | WEIGHT: 139.2 LBS

## 2020-10-02 DIAGNOSIS — Z01.419 ENCOUNTER FOR ANNUAL ROUTINE GYNECOLOGICAL EXAMINATION: Primary | ICD-10-CM

## 2020-10-02 DIAGNOSIS — Z30.017 NEXPLANON INSERTION: ICD-10-CM

## 2020-10-02 LAB — SL AMB POCT URINE HCG: NEGATIVE

## 2020-10-02 PROCEDURE — 81025 URINE PREGNANCY TEST: CPT | Performed by: OBSTETRICS & GYNECOLOGY

## 2020-10-02 PROCEDURE — 11981 INSERTION DRUG DLVR IMPLANT: CPT | Performed by: OBSTETRICS & GYNECOLOGY

## 2020-10-02 PROCEDURE — 99395 PREV VISIT EST AGE 18-39: CPT | Performed by: OBSTETRICS & GYNECOLOGY

## 2020-10-02 PROCEDURE — G0145 SCR C/V CYTO,THINLAYER,RESCR: HCPCS | Performed by: OBSTETRICS & GYNECOLOGY

## 2020-10-11 ENCOUNTER — TELEPHONE (OUTPATIENT)
Dept: OBGYN CLINIC | Facility: MEDICAL CENTER | Age: 25
End: 2020-10-11

## 2020-10-12 LAB
LAB AP GYN PRIMARY INTERPRETATION: NORMAL
Lab: NORMAL

## 2020-10-13 ENCOUNTER — TELEPHONE (OUTPATIENT)
Dept: OBGYN CLINIC | Facility: MEDICAL CENTER | Age: 25
End: 2020-10-13

## 2020-10-15 ENCOUNTER — TELEPHONE (OUTPATIENT)
Dept: OBGYN CLINIC | Facility: MEDICAL CENTER | Age: 25
End: 2020-10-15

## 2020-11-02 ENCOUNTER — OFFICE VISIT (OUTPATIENT)
Dept: OBGYN CLINIC | Facility: CLINIC | Age: 25
End: 2020-11-02
Payer: COMMERCIAL

## 2020-11-02 VITALS — DIASTOLIC BLOOD PRESSURE: 75 MMHG | BODY MASS INDEX: 23.52 KG/M2 | WEIGHT: 137 LBS | SYSTOLIC BLOOD PRESSURE: 115 MMHG

## 2020-11-02 DIAGNOSIS — Z97.5 NEXPLANON IN PLACE: Primary | ICD-10-CM

## 2020-11-02 PROCEDURE — 99212 OFFICE O/P EST SF 10 MIN: CPT | Performed by: OBSTETRICS & GYNECOLOGY

## 2021-02-19 ENCOUNTER — IMMUNIZATIONS (OUTPATIENT)
Dept: FAMILY MEDICINE CLINIC | Facility: HOSPITAL | Age: 26
End: 2021-02-19

## 2021-02-19 DIAGNOSIS — Z23 ENCOUNTER FOR IMMUNIZATION: Primary | ICD-10-CM

## 2021-02-19 PROCEDURE — 91300 SARS-COV-2 / COVID-19 MRNA VACCINE (PFIZER-BIONTECH) 30 MCG: CPT

## 2021-02-19 PROCEDURE — 0001A SARS-COV-2 / COVID-19 MRNA VACCINE (PFIZER-BIONTECH) 30 MCG: CPT

## 2021-03-10 ENCOUNTER — IMMUNIZATIONS (OUTPATIENT)
Dept: FAMILY MEDICINE CLINIC | Facility: HOSPITAL | Age: 26
End: 2021-03-10

## 2021-03-10 DIAGNOSIS — Z23 ENCOUNTER FOR IMMUNIZATION: Primary | ICD-10-CM

## 2021-03-10 PROCEDURE — 0002A SARS-COV-2 / COVID-19 MRNA VACCINE (PFIZER-BIONTECH) 30 MCG: CPT

## 2021-03-10 PROCEDURE — 91300 SARS-COV-2 / COVID-19 MRNA VACCINE (PFIZER-BIONTECH) 30 MCG: CPT

## 2022-09-06 ENCOUNTER — ANNUAL EXAM (OUTPATIENT)
Dept: OBGYN CLINIC | Facility: CLINIC | Age: 27
End: 2022-09-06
Payer: COMMERCIAL

## 2022-09-06 VITALS
BODY MASS INDEX: 23.56 KG/M2 | HEIGHT: 64 IN | DIASTOLIC BLOOD PRESSURE: 70 MMHG | SYSTOLIC BLOOD PRESSURE: 110 MMHG | WEIGHT: 138 LBS

## 2022-09-06 DIAGNOSIS — Z01.419 ENCOUNTER FOR WELL WOMAN EXAM WITH ROUTINE GYNECOLOGICAL EXAM: Primary | ICD-10-CM

## 2022-09-06 PROCEDURE — S0612 ANNUAL GYNECOLOGICAL EXAMINA: HCPCS | Performed by: OBSTETRICS & GYNECOLOGY

## 2022-09-06 NOTE — PROGRESS NOTES
[facility] [address], [address], [place], [place]    ASSESSMENT/PLAN: Lupe Lugo is a 24 y o  Claudia Rogers who presents for annual gynecologic exam     Encounter for routine gynecologic examination  - Routine well woman exam completed today  - Cervical Cancer Screening: Current ASCCP Guidelines reviewed  Last Pap: 10/02/2020  Next Pap Due: 2023  - HPV Vaccination status: Immunization series complete  - STI screening offered including HIV testing: not ordered  - Contraceptive counseling discussed  Current contraception: Nexplanon since 2020; would like to remove and get try to conceive    Additional problems addressed during this visit:  1  Encounter for well woman exam with routine gynecological exam      CC:  Annual Gynecologic Examination    HPI: Lupe Lugo is a 24 y o  Claudia Rogers who presents for annual gynecologic examination  HPI  She reports no new changes to her health  She reports no breast concerns  She does not get regular periods, Nexplanon in place  She has no vaginal discharge, vulvar or vaginal lesions, pelvic pain, or abnormal bleeding  She has no sexual health concerns and is currently sexually active with one male partner  She contracepts with nexplanon  She has no symptoms of pelvic organ prolapse, urinary, or fecal incontinence  She denies intimate partner violence  The following portions of the patient's history were reviewed and updated as appropriate: allergies, current medications, past family history, past medical history, obstetric history, gynecologic history, past social history, past surgical history and problem list     Review of Systems   Constitutional: Negative  HENT: Negative  Eyes: Negative  Respiratory: Negative  Cardiovascular: Negative  Gastrointestinal: Negative  Genitourinary: Negative  Musculoskeletal: Negative  All other systems reviewed and are negative          Objective:  /70   Ht 5' 4" (1 626 m)   Wt 62 6 kg (138 lb)   LMP  (LMP Unknown)   BMI 23 69 kg/m²    Physical Exam  Vitals reviewed  Constitutional:       General: She is not in acute distress  Appearance: She is well-developed  HENT:      Head: Normocephalic and atraumatic  Nose: Nose normal    Cardiovascular:      Rate and Rhythm: Normal rate  Pulmonary:      Effort: Pulmonary effort is normal  No respiratory distress  Chest:   Breasts: Breasts are symmetrical       Right: Normal  No mass, nipple discharge, skin change, tenderness, axillary adenopathy or supraclavicular adenopathy  Left: Normal  No mass, nipple discharge, skin change, tenderness, axillary adenopathy or supraclavicular adenopathy  Abdominal:      General: There is no distension  Palpations: Abdomen is soft  There is no mass  Tenderness: There is no abdominal tenderness  There is no guarding or rebound  Genitourinary:     General: Normal vulva  Exam position: Lithotomy position  Labia:         Right: No lesion  Left: No lesion  Urethra: No prolapse (urethral meatus normal)  Vagina: Normal  No vaginal discharge, erythema or bleeding  Cervix: Normal       Uterus: Normal        Adnexa: Right adnexa normal and left adnexa normal    Musculoskeletal:         General: Normal range of motion  Cervical back: Normal range of motion  Lymphadenopathy:      Upper Body:      Right upper body: No supraclavicular, axillary or pectoral adenopathy  Left upper body: No supraclavicular, axillary or pectoral adenopathy  Lower Body: No right inguinal adenopathy  No left inguinal adenopathy  Skin:     General: Skin is warm and dry  Neurological:      Mental Status: She is alert and oriented to person, place, and time  Psychiatric:         Behavior: Behavior normal          Thought Content:  Thought content normal          Judgment: Judgment normal

## 2022-11-04 ENCOUNTER — PROCEDURE VISIT (OUTPATIENT)
Dept: OBGYN CLINIC | Facility: CLINIC | Age: 27
End: 2022-11-04

## 2022-11-04 VITALS
WEIGHT: 140 LBS | HEIGHT: 64 IN | SYSTOLIC BLOOD PRESSURE: 110 MMHG | DIASTOLIC BLOOD PRESSURE: 70 MMHG | BODY MASS INDEX: 23.9 KG/M2

## 2022-11-04 DIAGNOSIS — Z30.46 NEXPLANON REMOVAL: Primary | ICD-10-CM

## 2022-11-04 DIAGNOSIS — Z78.9 ATTEMPTING TO CONCEIVE: ICD-10-CM

## 2022-11-04 PROBLEM — Z22.330 GBS CARRIER: Status: RESOLVED | Noted: 2020-06-09 | Resolved: 2022-11-04

## 2022-11-04 PROBLEM — R82.71 GBS BACTERIURIA: Status: RESOLVED | Noted: 2020-06-24 | Resolved: 2022-11-04

## 2022-11-04 PROBLEM — Z97.5 NEXPLANON IN PLACE: Status: RESOLVED | Noted: 2020-11-02 | Resolved: 2022-11-04

## 2022-11-04 PROBLEM — Z34.90 CURRENTLY PREGNANT: Status: RESOLVED | Noted: 2020-08-06 | Resolved: 2022-11-04

## 2022-11-04 PROBLEM — Z34.93 THIRD TRIMESTER PREGNANCY: Status: RESOLVED | Noted: 2020-05-27 | Resolved: 2022-11-04

## 2022-11-04 PROBLEM — Z3A.39 39 WEEKS GESTATION OF PREGNANCY: Status: RESOLVED | Noted: 2020-07-29 | Resolved: 2022-11-04

## 2022-11-04 RX ORDER — VITAMIN A, VITAMIN C, VITAMIN D-3, VITAMIN E, VITAMIN B-1, VITAMIN B-2, NIACIN, VITAMIN B-6, CALCIUM, IRON, ZINC, COPPER 4000; 120; 400; 22; 1.84; 3; 20; 10; 1; 12; 200; 27; 25; 2 [IU]/1; MG/1; [IU]/1; MG/1; MG/1; MG/1; MG/1; MG/1; MG/1; UG/1; MG/1; MG/1; MG/1; MG/1
1 TABLET ORAL DAILY
Qty: 90 TABLET | Refills: 3 | Status: SHIPPED | OUTPATIENT
Start: 2022-11-04

## 2022-11-04 NOTE — PROGRESS NOTES
Universal Protocol:  Consent: Verbal consent obtained  Written consent obtained    Risks and benefits: risks, benefits and alternatives were discussed  Consent given by: patient  Patient understanding: patient states understanding of the procedure being performed  Patient consent: the patient's understanding of the procedure matches consent given  Required items: required blood products, implants, devices, and special equipment available  Patient identity confirmed: verbally with patient    Remove and insert drug implant    Date/Time: 11/4/2022 8:16 AM  Performed by: Sasha Gonzalez MD  Authorized by: Sasha Gonzalez MD     Indication:     Indication: Presence of non-biodegradable drug delivery implant    Pre-procedure:     Prepped with: alcohol 70% and povidone-iodine      Local anesthetic:  Lidocaine 1%    The site was cleaned and prepped in a sterile fashion: yes    Procedure:     Procedure:  Removal    Small stab incision was made in arm: yes      Left/right:  Left    Site was closed with steri-strips and pressure bandage applied: yes

## 2022-12-09 ENCOUNTER — OFFICE VISIT (OUTPATIENT)
Dept: URGENT CARE | Facility: MEDICAL CENTER | Age: 27
End: 2022-12-09

## 2022-12-09 VITALS
OXYGEN SATURATION: 100 % | DIASTOLIC BLOOD PRESSURE: 93 MMHG | RESPIRATION RATE: 18 BRPM | SYSTOLIC BLOOD PRESSURE: 127 MMHG | HEART RATE: 93 BPM | TEMPERATURE: 98.2 F

## 2022-12-09 DIAGNOSIS — R35.0 URINARY FREQUENCY: Primary | ICD-10-CM

## 2022-12-09 DIAGNOSIS — R82.998 LEUKOCYTES IN URINE: ICD-10-CM

## 2022-12-09 LAB
SL AMB  POCT GLUCOSE, UA: NORMAL
SL AMB LEUKOCYTE ESTERASE,UA: NORMAL
SL AMB POCT BILIRUBIN,UA: NORMAL
SL AMB POCT BLOOD,UA: NORMAL
SL AMB POCT CLARITY,UA: NORMAL
SL AMB POCT COLOR,UA: YELLOW
SL AMB POCT KETONES,UA: NORMAL
SL AMB POCT NITRITE,UA: NORMAL
SL AMB POCT PH,UA: 6
SL AMB POCT SPECIFIC GRAVITY,UA: 1.02
SL AMB POCT URINE PROTEIN: NORMAL
SL AMB POCT UROBILINOGEN: 0.2

## 2022-12-09 RX ORDER — CIPROFLOXACIN 500 MG/1
500 TABLET, FILM COATED ORAL EVERY 12 HOURS SCHEDULED
Qty: 10 TABLET | Refills: 0 | Status: SHIPPED | COMMUNITY
Start: 2022-12-09 | End: 2022-12-14

## 2022-12-09 RX ORDER — NITROFURANTOIN 25; 75 MG/1; MG/1
100 CAPSULE ORAL 2 TIMES DAILY
Qty: 14 CAPSULE | Refills: 0 | Status: CANCELLED | OUTPATIENT
Start: 2022-12-09 | End: 2022-12-16

## 2022-12-09 NOTE — PROGRESS NOTES
[facility]        NAME: Faisal Redmond is a 24 y o  female  : 1995    MRN: [id number]  DATE: 2022  TIME: 11:01 AM    Assessment and Plan   Urinary frequency [R35 0]  1  Urinary frequency  POCT urine dip auto non-scope    Urine culture    ciprofloxacin (CIPRO) 500 mg tablet      2  Leukocytes in urine  ciprofloxacin (CIPRO) 500 mg tablet            Patient Instructions       Follow up with PCP in 3-5 days  Proceed to  ER if symptoms worsen  Patient Instructions   Urine dip in office shows moderate leukocytes  Symptoms suggestive of UTI  Urine culture sent for antibiotic susceptibility  Will  notify patient of any need for alternative therapy  Patient provided with oral antibiotic [facility] for 5 day course  Take as directed  Encouraged adequate fluid hydration  Follow up as needed for any persistent or worsening symptoms  Chief Complaint     Chief Complaint   Patient presents with   • Urinary Frequency     Patient urinary frequency and dysuria that started this morning  History of Present Illness       Pt here today with c/o painful urination that began this morning  Pt feels pressure and burning  No visible blood in urine  Increased need to excrete  Afebrile  Mild low abdominal pain  Currently trying to get pregnant  LMP 22  Urinary Frequency   Associated symptoms include frequency  Pertinent negatives include no hematuria, nausea, possible pregnancy or vomiting  Review of Systems   Review of Systems   Constitutional: Negative for fever  HENT: Negative for congestion, ear pain, sneezing and sore throat  Eyes: Negative for discharge and redness  Respiratory: Negative for cough, chest tightness and shortness of breath  Cardiovascular: Negative for chest pain  Gastrointestinal: Negative for abdominal pain, diarrhea, nausea and vomiting  Genitourinary: Positive for dysuria and frequency   Negative for decreased urine volume and hematuria  Musculoskeletal: Negative for myalgias  Allergic/Immunologic: Negative for environmental allergies  Neurological: Negative for dizziness, syncope and headaches  Hematological: Negative for adenopathy  Psychiatric/Behavioral: Negative for sleep disturbance  Current Medications       Current Outpatient Medications:   •  ciprofloxacin (CIPRO) 500 mg tablet, Take 1 tablet (500 mg total) by mouth every 12 (twelve) hours for 5 days, Disp: 10 tablet, Rfl: 0    Current Allergies     Allergies as of 12/09/2022 - Reviewed 12/09/2022   Allergen Reaction Noted   • Penicillins Hives 07/06/2015            The following portions of the patient's history were reviewed and updated as appropriate: allergies, current medications, past family history, past medical history, past social history, past surgical history and problem list      Past Medical History:   Diagnosis Date   • Anemia 8/6/2020   • Migraine    • Urinary tract infection     frequent UTI in past   • Varicella        Past Surgical History:   Procedure Laterality Date   • BREAST SURGERY      breast augemtation   • TOE SURGERY         Family History   Problem Relation Age of Onset   • Migraines Mother    • Coronary artery disease Father    • Coronary artery disease Paternal Grandmother    • No Known Problems Brother    • No Known Problems Maternal Grandmother    • Diabetes Maternal Grandfather    • Heart disease Paternal Grandfather          Medications have been verified  Objective   /93   Pulse 93   Temp 98 2 °F (36 8 °C)   Resp 18   SpO2 100%   No LMP recorded  Patient has had an implant  Physical Exam     Physical Exam  Vitals reviewed  Constitutional:       General: She is not in acute distress  Appearance: She is well-developed  She is not ill-appearing  Eyes:      General: Lids are normal          Right eye: No discharge  Left eye: No discharge     Cardiovascular:      Rate and Rhythm: Regular rhythm  Heart sounds: Normal heart sounds, S1 normal and S2 normal    Pulmonary:      Effort: No tachypnea or respiratory distress  Breath sounds: No decreased breath sounds, wheezing or rhonchi  Abdominal:      General: Bowel sounds are normal  There is no distension  Palpations: Abdomen is soft  There is no hepatomegaly, splenomegaly or mass  Tenderness: There is abdominal tenderness (mild) in the suprapubic area  Musculoskeletal:      Cervical back: Normal range of motion  Lymphadenopathy:      Cervical: No cervical adenopathy  Skin:     General: Skin is warm and dry  Capillary Refill: Capillary refill takes less than 2 seconds  Neurological:      Mental Status: She is alert  Psychiatric:         Behavior: Behavior normal  Behavior is cooperative

## 2022-12-09 NOTE — PATIENT INSTRUCTIONS
Urine dip in office shows moderate leukocytes  Symptoms suggestive of UTI  Urine culture sent for antibiotic susceptibility  Will  notify patient of any need for alternative therapy  Patient provided with oral antibiotic therapy here at [facility] for 5 day course  Take as directed  Encouraged adequate fluid hydration  Follow up as needed for any persistent or worsening symptoms

## 2022-12-11 ENCOUNTER — TELEPHONE (OUTPATIENT)
Dept: URGENT CARE | Facility: MEDICAL CENTER | Age: 27
End: 2022-12-11

## 2022-12-11 LAB — BACTERIA UR CULT: ABNORMAL

## 2022-12-11 NOTE — TELEPHONE ENCOUNTER
----- Message from Lucy Bonilla PA-C sent at 12/10/2022  3:19 PM EST -----  Regarding: FW:  ----- Message -----  From: Tala Taylor LPN  Sent: [id number]/7/5271  10:27 AM EST  To: Lucy Bonilla PA-C

## 2023-01-20 ENCOUNTER — TELEPHONE (OUTPATIENT)
Dept: OBGYN CLINIC | Facility: MEDICAL CENTER | Age: 28
End: 2023-01-20

## 2023-01-20 DIAGNOSIS — Z32.01 POSITIVE PREGNANCY TEST: Primary | ICD-10-CM

## 2023-01-26 ENCOUNTER — APPOINTMENT (OUTPATIENT)
Dept: LAB | Facility: MEDICAL CENTER | Age: 28
End: 2023-01-26

## 2023-01-26 DIAGNOSIS — Z32.01 POSITIVE PREGNANCY TEST: ICD-10-CM

## 2023-01-26 LAB
B-HCG SERPL-ACNC: 1317 MIU/ML
PROGEST SERPL-MCNC: 16.8 NG/ML

## 2023-01-27 ENCOUNTER — TELEPHONE (OUTPATIENT)
Dept: OBGYN CLINIC | Facility: MEDICAL CENTER | Age: 28
End: 2023-01-27

## 2023-01-27 DIAGNOSIS — N92.6 MISSED MENSES: Primary | ICD-10-CM

## 2023-02-01 ENCOUNTER — APPOINTMENT (OUTPATIENT)
Dept: LAB | Facility: MEDICAL CENTER | Age: 28
End: 2023-02-01

## 2023-02-01 DIAGNOSIS — N92.6 MISSED MENSES: ICD-10-CM

## 2023-02-01 LAB — B-HCG SERPL-ACNC: 9403 MIU/ML

## 2023-02-23 ENCOUNTER — ULTRASOUND (OUTPATIENT)
Dept: OBGYN CLINIC | Facility: CLINIC | Age: 28
End: 2023-02-23

## 2023-02-23 VITALS
DIASTOLIC BLOOD PRESSURE: 80 MMHG | BODY MASS INDEX: 24.07 KG/M2 | SYSTOLIC BLOOD PRESSURE: 122 MMHG | HEIGHT: 64 IN | WEIGHT: 141 LBS

## 2023-02-23 DIAGNOSIS — Z34.91 FIRST TRIMESTER PREGNANCY: ICD-10-CM

## 2023-02-23 DIAGNOSIS — N92.6 MISSED MENSES: Primary | ICD-10-CM

## 2023-02-23 RX ORDER — PNV NO.118/IRON FUMARATE/FA 29 MG-1 MG
1 TABLET,CHEWABLE ORAL DAILY
COMMUNITY

## 2023-02-23 NOTE — PROGRESS NOTES
Assessment Tracey Dawn was seen today for pregnancy ultrasound  Diagnoses and all orders for this visit:    Missed menses  -     AMB US OB < 14 weeks single or first gestation level 1    First trimester pregnancy  -     Ambulatory Referral to Maternal Fetal Medicine; Future         Plan  Us performed today confirms live IUP   [facility] 10/1/23   Encouraged to continue prenatal vitamins   Schedule OB intake   Briefly discussed genetic screening options - referral to MFM given if decided to proceed with screening     Subjective   Cate Abdul is a 34 y o  female here for a problem visit  Patient is complaining of missed menses   Sure LMP was 22   Pregnancy is desired   Unsure of desires genetic screening   States taking prenatal vitamins and other than occasional nausea has been feeling well   Patient Active Problem List   Diagnosis   • Dysmenorrhea   • Migraine   • Anxiety   • Anemia   •  (spontaneous vaginal delivery)       Gynecologic History  No LMP recorded  Patient has had an implant  The current method of family planning is none      Past Medical History:   Diagnosis Date   • Anemia 2020   • Migraine    • Urinary tract infection     frequent UTI in past   • Varicella      Past Surgical History:   Procedure Laterality Date   • BREAST SURGERY      breast augemtation   • TOE SURGERY       Family History   Problem Relation Age of Onset   • Migraines Mother    • Coronary artery disease Father    • Coronary artery disease Paternal Grandmother    • No Known Problems Brother    • No Known Problems Maternal Grandmother    • Diabetes Maternal Grandfather    • Heart disease Paternal Grandfather      Social History     Socioeconomic History   • Marital status: /Civil Union     Spouse name: Not on file   • Number of children: Not on file   • Years of education: Not on file   • Highest education level: Not on file   Occupational History   • Occupation: student   Tobacco Use   • Smoking status: Never   • Smokeless tobacco: Never   Vaping Use   • Vaping Use: Never used   Substance and Sexual Activity   • Alcohol use: Yes     Comment: social   • Drug use: Never   • Sexual activity: Yes     Partners: Male     Birth control/protection: Implant   Other Topics Concern   • Not on file   Social History Narrative   • Not on file     Social Determinants of Health     Financial Resource Strain: Not on file   Food Insecurity: Not on file   Transportation Needs: Not on file   Physical Activity: Not on file   Stress: Not on file   Social Connections: Not on file   Intimate Partner Violence: Not on file   Housing Stability: Not on file     Allergies   Allergen Reactions   • Penicillins Hives     Tolerates cephalosporins       Current Outpatient Medications:   •  Prenatal Vit-Fe Fumarate-FA (Prenatal 19) tablet, Chew 1 tablet daily, Disp: , Rfl:     Review of Systems  Constitutional :no fever, feels well, no tiredness, no recent weight gain or loss  ENT: no ear ache, no loss of hearing, no nosebleeds or nasal discharge, no sore throat or hoarseness  Cardiovascular: no complaints of slow or fast heart beat, no chest pain, no palpitations, no leg claudication or lower extremity edema  Respiratory: no complaints of shortness of shortness of breath, no MITCHELL  Breasts:no complaints of breast pain, breast lump, or nipple discharge  Gastrointestinal: no complaints of abdominal pain, constipation, nausea, vomiting, or diarrhea or bloody stools  Genitourinary : no complaints of dysuria, incontinence, pelvic pain, no dysmenorrhea, vaginal discharge or abnormal vaginal bleeding and as noted in HPI  Musculoskeletal: no complaints of arthralgia, no myalgia, no joint swelling or stiffness, no limb pain or swelling    Integumentary: no complaints of skin rash or lesion, itching or dry skin  Neurological: no complaints of headache, no confusion, no numbness or tingling, no dizziness or fainting     Objective     /80   Ht 5' 4" (1 626 m)   Wt 64 kg (141 lb)   BMI 24 20 kg/m²     General:   appears stated age, cooperative, alert normal mood and affect   Lungs: Unlabored breathing     Abdomen: soft, non-tender, without masses or organomegaly   Vulva: normal   Vagina: normal vagina, no discharge, exudate, lesion, or erythema   Urethra: normal   Cervix: Normal, no discharge  Nontender  Uterus: normal size, contour, position, consistency, mobility, non-tender   Adnexa: no mass, fullness, tenderness   Skin normal skin turgor and no rashes     Psychiatric orientation to person, place, and time: normal  mood and affect: normal

## 2023-03-02 ENCOUNTER — TELEPHONE (OUTPATIENT)
Dept: PERINATAL CARE | Facility: CLINIC | Age: 28
End: 2023-03-02

## 2023-03-02 NOTE — TELEPHONE ENCOUNTER
Called patient to schedule MFM appointment, based on referral issued to Maternal Fetal Medicine by [facility] office  Left voicemail requesting patient to call back and schedule appointment, with office number for return call [phone]

## 2023-03-06 ENCOUNTER — TELEPHONE (OUTPATIENT)
Facility: HOSPITAL | Age: 28
End: 2023-03-06

## 2023-03-06 NOTE — TELEPHONE ENCOUNTER
Spoke with PT 3/6 regarding scheduling appt's with MFM based on referral  PT said her OB put the referral in just in case the PT wanted to schedule with us  PT said she is not interested in scheduling with us right now, and I told the PT that I would dina the referral to not contact the PT anymore but she is welcome to call office back if would like to schedule with us in the future

## 2023-03-15 ENCOUNTER — INITIAL PRENATAL (OUTPATIENT)
Dept: OBGYN CLINIC | Facility: MEDICAL CENTER | Age: 28
End: 2023-03-15

## 2023-03-15 VITALS
SYSTOLIC BLOOD PRESSURE: 108 MMHG | WEIGHT: 141 LBS | BODY MASS INDEX: 24.07 KG/M2 | DIASTOLIC BLOOD PRESSURE: 68 MMHG | HEIGHT: 64 IN

## 2023-03-15 DIAGNOSIS — Z34.81 PRENATAL CARE, SUBSEQUENT PREGNANCY, FIRST TRIMESTER: Primary | ICD-10-CM

## 2023-03-15 NOTE — PROGRESS NOTES
OB History    Para Term  AB Living   2 1 1     1   SAB IAB Ectopic Multiple Live Births         0 1      # Outcome Date GA Lbr Eron/2nd Weight Sex Delivery Anes PTL Lv   2 Current            1 Term 20 40w3d / 00:23 3745 g (8 lb 4 1 oz) M Vag-Spont EPI N SHERWIN         * Pt presents for OB intake  *  *Pt's LMP was Patient's last menstrual period was 2022 (exact date)  *Ultrasound date:2023   8weeks 4days  *Estimated date of delivery: 10/01/2023   * confirmed by lmp    *Signs/Symptoms of Pregnancy   *no Constipation    *yes Headaches   *no Cramping  *no Spotting      Diabetes     *no Hx of GDM    *no BMI >35    *no First degree relative with type 2 diabetes     Hypertension-    *no Hx of chronic HTN    *no Hx of gestational HTN    *Infection Screening   *no Does the pt have a hx of MRSA? *no History of herpes? *Immunizations:   *no Discussed influenza vaccine     Declined at this time   *yes Discussed TDaP vaccine   *yes COVID Vaccine x2    [facility]  Depression *no   Anxiety*no  Medications*no    *Interview education   *Handouts given:    *Baby and Me support center     *[facility]    *Lab Locations    *[facility] List/Choosing Pediatrician Sheet    *[facility] Childbirth and Parenting Classes    *Schedule for Prenatal Visits    *Pregnancy Warning Signs Reviewed    *Safe Medications During Pregnancy    *SMA and CF Testing information sheet    *CPT Code Sheet/MFM 13week NT pamphlet    *[employer]      SBIRT Screen:  Depression Screening Follow-up Plan: Patient's depression screening was negative with an [place] score of 1         *[facility]   *no discussed genetic testing- pt interested   Patient has been informed of basic prenatal advice such as avoiding alcohol, drugs, and smoking  She should remain hydrated and take daily prenatal vitamins  Patient should avoid caffeine, raw sprouts, high mercury fish, undercooked fish, raw eggs, organ meat, unwashed produce, and unpasteurized cheeses, milk, and fruit juice and undercooked meats  She has been informed about toxoplasmosis and to avoid cat feces  *Details that I feel the provider should be aware of:  Jessica Marion presented today for initial OB intake at 11w3d  She has no complaints at this time  Prenatal panel ordered  Declined prenatal carrier screening  Advised patient to call [facility] to schedule 12w scan if desired as well as the 20w anatomy scan  Referral already in place  PN1 visit scheduled for *03/28/2023  The patient was oriented to our practice and all questions were answered      Interviewed by:  Dee Ryder

## 2023-03-16 ENCOUNTER — ULTRASOUND (OUTPATIENT)
Dept: OBGYN CLINIC | Facility: MEDICAL CENTER | Age: 28
End: 2023-03-16

## 2023-03-16 VITALS — DIASTOLIC BLOOD PRESSURE: 88 MMHG | SYSTOLIC BLOOD PRESSURE: 130 MMHG

## 2023-03-16 DIAGNOSIS — O02.1 MISSED ABORTION: Primary | ICD-10-CM

## 2023-03-16 NOTE — PROGRESS NOTES
Assessment/Plan    Today we discussed findings consistent with incomplete      Problem List Items Addressed This Visit    None  Visit Diagnoses     Missed     -  Primary          We discussed treatment options including :  1  Expectant management : Aware of risks and benefits of this including but not limited to timing , risk of bleeding and need for other interventions   2  Medical management : We discussed the use of Cytotec  Risks of this medication as well as proper use were reviewed  Aware of potential failure and need for repeat dosage   3  Surgical Management : We discussed D&E risks and benefits including but not limited to bleeding, infection , perforation , retained products needing further surgery  Most common causes of miscarriage were discussed  Genetic testing was dicussed  Aware that sometimes testing comes back inconclusive  Patient undecided regarding management and genetic testing      Bonny Espinoza is a 34 y o  female  Ulye Delude reports bleeding since yesterday  She is not in acute distress  The following portions of the patient's history were reviewed and updated as appropriate: allergies, current medications, past family history, past medical history, past social history, past surgical history and problem list     Review of Systems  Pertinent items are noted in HPI        Objective      /88   LMP 2022 (Exact Date)     General: alert and oriented, in no acute distress   Heart: regular rate and rhythm, S1, S2 normal, no murmur, click, rub or gallop   Lungs: clear to auscultation bilaterally   Abdomen: soft, non-tender, without masses or organomegaly   Vulva: normal   Vagina: scant blood   Cervix: no lesions   Uterus: normal size   Adnexa: no mass, fullness, tenderness     Imaging  Limited office ultrasound:   TVUS done, fetal pole identified, no FCA noted  CRL 2 13 - 2 21 cm  c/w 8w6d  TATE by LMP is 10/1/23, 11w4  Consistent with missed

## 2023-03-17 ENCOUNTER — TELEPHONE (OUTPATIENT)
Dept: OBGYN CLINIC | Facility: MEDICAL CENTER | Age: 28
End: 2023-03-17

## 2023-03-17 DIAGNOSIS — O02.1 MISSED ABORTION: Primary | ICD-10-CM

## 2023-03-17 RX ORDER — IBUPROFEN 800 MG/1
800 TABLET ORAL EVERY 6 HOURS PRN
Qty: 30 TABLET | Refills: 0 | Status: SHIPPED | OUTPATIENT
Start: 2023-03-17

## 2023-03-17 RX ORDER — DOXYCYCLINE 100 MG/1
200 TABLET ORAL ONCE
Qty: 2 TABLET | Refills: 0 | Status: SHIPPED | OUTPATIENT
Start: 2023-03-17 | End: 2023-03-17

## 2023-03-17 RX ORDER — LORAZEPAM 1 MG/1
1 TABLET ORAL EVERY 8 HOURS PRN
Qty: 8 TABLET | Refills: 0 | Status: SHIPPED | OUTPATIENT
Start: 2023-03-17
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
## 2023-03-21 ENCOUNTER — PROCEDURE VISIT (OUTPATIENT)
Dept: OBGYN CLINIC | Facility: MEDICAL CENTER | Age: 28
End: 2023-03-21

## 2023-03-21 VITALS
WEIGHT: 140 LBS | HEIGHT: 64 IN | SYSTOLIC BLOOD PRESSURE: 110 MMHG | BODY MASS INDEX: 23.9 KG/M2 | DIASTOLIC BLOOD PRESSURE: 78 MMHG

## 2023-03-21 DIAGNOSIS — O03.9 COMPLETE ABORTION: Primary | ICD-10-CM

## 2023-03-21 NOTE — PROGRESS NOTES
[facility] [address] [place] [address]    Assessment/Plan:  No problem-specific Assessment & Plan notes found for this encounter  Diagnoses and all orders for this visit:    Complete           Subjective:   Gil Bonilla is a 34 y o   female  CC: bleeding    HPI: HPI  Patient presents for follow up of a missed ab  She states she started bleeding heavier over the weekend  Had increased clots as well  Denies lightheadedness, dizziness, or excessive bleeding  ROS: Review of Systems   Constitutional: Negative  Respiratory: Negative  Cardiovascular: Negative  Gastrointestinal: Negative  Genitourinary: Positive for vaginal bleeding  Musculoskeletal: Negative  All other systems reviewed and are negative  PFSH: The following portions of the patient's history were reviewed and updated as appropriate: allergies, current medications, past family history, past medical history, obstetric history, gynecologic history, past social history, past surgical history and problem list        Objective:  /78 (BP Location: Left arm)   Ht 5' 4" (1 626 m)   Wt 63 5 kg (140 lb)   LMP 2022 (Exact Date)   BMI 24 03 kg/m²    Physical Exam  Vitals reviewed  Constitutional:       Appearance: Normal appearance  Cardiovascular:      Rate and Rhythm: Normal rate  Pulmonary:      Effort: Pulmonary effort is normal  No respiratory distress  Neurological:      Mental Status: She is alert     Psychiatric:         Mood and Affect: Mood normal          Behavior: Behavior normal          TVUS: no fetal pole noted, EMS 12-14 mm  No adnexal masses

## 2023-05-15 ENCOUNTER — TELEPHONE (OUTPATIENT)
Dept: OBGYN CLINIC | Facility: MEDICAL CENTER | Age: 28
End: 2023-05-15

## 2023-06-13 ENCOUNTER — OFFICE VISIT (OUTPATIENT)
Dept: URGENT CARE | Facility: MEDICAL CENTER | Age: 28
End: 2023-06-13
Payer: COMMERCIAL

## 2023-06-13 VITALS
RESPIRATION RATE: 20 BRPM | OXYGEN SATURATION: 99 % | HEART RATE: 69 BPM | SYSTOLIC BLOOD PRESSURE: 115 MMHG | TEMPERATURE: 98.6 F | DIASTOLIC BLOOD PRESSURE: 73 MMHG | HEIGHT: 63 IN | BODY MASS INDEX: 24.8 KG/M2 | WEIGHT: 140 LBS

## 2023-06-13 DIAGNOSIS — J01.90 ACUTE NON-RECURRENT SINUSITIS, UNSPECIFIED LOCATION: ICD-10-CM

## 2023-06-13 DIAGNOSIS — H92.03 EAR PAIN, BILATERAL: Primary | ICD-10-CM

## 2023-06-13 PROCEDURE — 99213 OFFICE O/P EST LOW 20 MIN: CPT | Performed by: PHYSICIAN ASSISTANT

## 2023-06-13 RX ORDER — METHYLPREDNISOLONE 4 MG/1
TABLET ORAL
Qty: 1 EACH | Refills: 0 | Status: SHIPPED | OUTPATIENT
Start: 2023-06-13

## 2023-06-13 RX ORDER — AZITHROMYCIN 250 MG/1
TABLET, FILM COATED ORAL
Qty: 6 TABLET | Refills: 0 | Status: SHIPPED | OUTPATIENT
Start: 2023-06-13 | End: 2023-06-17

## 2023-06-13 NOTE — PROGRESS NOTES
[facility]        NAME: Jaylon Renteria is a 34 y o  female  : 1995    MRN: [id number]  DATE: 2023  TIME: 1:37 PM    Assessment and Plan   Ear pain, bilateral [H92 03]  1  Ear pain, bilateral  azithromycin (ZITHROMAX) 250 mg tablet    methylPREDNISolone 4 MG tablet therapy pack      2  Acute non-recurrent sinusitis, unspecified location  azithromycin (ZITHROMAX) 250 mg tablet    methylPREDNISolone 4 MG tablet therapy pack            Patient Instructions   Patient was educated on sinus infection  Patient was educated on bulging in B/L TM's  Patient was educated on antibiotics and steroids  Patient was told to eat on antibiotics  Patient was told to not take OTC anti-inflammatories while on steroids  Chief Complaint     Chief Complaint   Patient presents with   • Earache     B/l ear pain 1 week         History of Present Illness       Patient is here today complaining of congestion, cough and ear pain  Patient reports Left ear pain is worst then right ear pain  Patient was sick last week  Patient reports allergy to penicillin  Denies any history of diabetes and asthma  Review of Systems   Review of Systems   Constitutional: Negative  HENT: Positive for congestion and ear pain  Respiratory: Positive for cough  Cardiovascular: Negative  Psychiatric/Behavioral: Negative            Current Medications       Current Outpatient Medications:   •  azithromycin (ZITHROMAX) 250 mg tablet, Take 2 tablets today then 1 tablet daily x 4 days, Disp: 6 tablet, Rfl: 0  •  methylPREDNISolone 4 MG tablet therapy pack, Use as directed on package, Disp: 1 each, Rfl: 0  •  ibuprofen (MOTRIN) 800 mg tablet, Take 1 tablet (800 mg total) by mouth every 6 (six) hours as needed for mild pain, Disp: 30 tablet, Rfl: 0  •  LORazepam (ATIVAN) 1 mg tablet, Take 1 tablet (1 mg total) by mouth every 8 (eight) hours as needed for anxiety Take 1 hour prior to procedure, Disp: 8 tablet, Rfl: 0  • "Prenatal Vit-Fe Fumarate-FA (Prenatal 23) tablet, Chew 1 tablet daily, Disp: , Rfl:     Current Allergies     Allergies as of 06/13/2023 - Reviewed 06/13/2023   Allergen Reaction Noted   • Penicillins Hives 07/06/2015            The following portions of the patient's history were reviewed and updated as appropriate: allergies, current medications, past family history, past medical history, past social history, past surgical history and problem list      Past Medical History:   Diagnosis Date   • Migraine    • Urinary tract infection     frequent UTI in past       Past Surgical History:   Procedure Laterality Date   • BREAST SURGERY      breast augemtation   • TOE SURGERY         Family History   Problem Relation Age of Onset   • Migraines Mother    • Coronary artery disease Father    • No Known Problems Brother    • No Known Problems Son    • No Known Problems Maternal Grandmother    • Diabetes Maternal Grandfather    • Coronary artery disease Paternal Grandmother    • Heart disease Paternal Grandfather    • Breast cancer Neg Hx    • Colon cancer Neg Hx    • Ovarian cancer Neg Hx          Medications have been verified  Objective   /73   Pulse 69   Temp 98 6 °F (37 °C)   Resp 20   Ht 5' 3\" (1 6 m)   Wt 63 5 kg (140 lb)   LMP 12/25/2022 (Exact Date)   SpO2 99%   BMI 24 80 kg/m²   Patient's last menstrual period was 12/25/2022 (exact date)  Physical Exam     Physical Exam  Vitals and nursing note reviewed  Constitutional:       Appearance: Normal appearance  HENT:      Head: Normocephalic  Comments: Pain over left frontal and maxillary sinus  No pain over right frontal or maxillary sinus     Ears:      Comments: Bulging B/L TM's  Mouth/Throat:      Mouth: Mucous membranes are moist    Eyes:      Pupils: Pupils are equal, round, and reactive to light  Cardiovascular:      Rate and Rhythm: Normal rate and regular rhythm  Heart sounds: Normal heart sounds     Pulmonary:      " Breath sounds: Normal breath sounds  No wheezing  Neurological:      General: No focal deficit present  Mental Status: She is alert and oriented to person, place, and time     Psychiatric:         Mood and Affect: Mood normal          Behavior: Behavior normal

## 2023-06-13 NOTE — PATIENT INSTRUCTIONS
Patient was educated on sinus infection  Patient was educated on bulging in B/L TM's  Patient was educated on antibiotics and steroids  Patient was told to eat on antibiotics  Patient was told to not take OTC anti-inflammatories while on steroids  Sinusitis   WHAT YOU NEED TO KNOW:   Sinusitis is inflammation or infection of your sinuses  Sinusitis is most often caused by a virus  Acute sinusitis may last up to 12 weeks  Chronic sinusitis lasts longer than 12 weeks  Recurrent sinusitis means you have 4 or more infections in 1 year  DISCHARGE INSTRUCTIONS:   Return to the emergency department if:   You have trouble breathing or wheezing that is getting worse  You have a stiff neck, a fever, or a bad headache  You cannot open your eye  Your eyeball bulges out or you cannot move your eye  You are more sleepy than normal, or you notice changes in your ability to think, move, or talk  You have swelling of your forehead or scalp  Call your doctor if:   You have vision changes, such as double vision  Your eye and eyelid are red, swollen, and painful  Your symptoms do not improve or go away after 10 days  You have nausea and are vomiting  Your nose is bleeding  You have questions or concerns about your condition or care  Medicines: Your symptoms may go away on their own  Your healthcare provider may recommend watchful waiting for up to 10 days before starting antibiotics  You may need any of the following:  Acetaminophen  decreases pain and fever  It is available without a doctor's order  Ask how much to take and how often to take it  Follow directions  Read the labels of all other medicines you are using to see if they also contain acetaminophen, or ask your doctor or pharmacist  Acetaminophen can cause liver damage if not taken correctly  NSAIDs , such as ibuprofen, help decrease swelling, pain, and fever  This medicine is available with or without a doctor's order  NSAIDs can cause stomach bleeding or kidney problems in certain people  If you take blood thinner medicine, always ask your healthcare provider if NSAIDs are safe for you  Always read the medicine label and follow directions  Nasal steroid sprays  may help decrease inflammation in your nose and sinuses  Decongestants  help reduce swelling and drain mucus in the nose and sinuses  They may help you breathe easier  Antihistamines  help dry mucus in the nose and relieve sneezing  Antibiotics  help treat or prevent a bacterial infection  Take your medicine as directed  Contact your healthcare provider if you think your medicine is not helping or if you have side effects  Tell your provider if you are allergic to any medicine  Keep a list of the medicines, vitamins, and herbs you take  Include the amounts, and when and why you take them  Bring the list or the pill bottles to follow-up visits  Carry your medicine list with you in case of an emergency  Self-care:   Rinse your sinuses as directed  Use a sinus rinse device to rinse your nasal passages with a saline (salt water) solution or distilled water  Do not use tap water  This will help thin the mucus in your nose and rinse away pollen and dirt  It will also help reduce swelling so you can breathe normally  Use a humidifier  to increase air moisture in your home  This may make it easier for you to breathe and help decrease your cough  Sleep with your head elevated  Place an extra pillow under your head before you go to sleep to help your sinuses drain  Drink liquids as directed  Ask your healthcare provider how much liquid to drink each day and which liquids are best for you  Liquids will thin the mucus in your nose and help it drain  Avoid drinks that contain alcohol or caffeine  Do not smoke, and avoid secondhand smoke  Nicotine and other chemicals in cigarettes and cigars can make your symptoms worse   Ask your healthcare provider for information if you currently smoke and need help to quit  E-cigarettes or smokeless tobacco still contain nicotine  Talk to your healthcare provider before you use these products  Prevent the spread of germs:   Wash your hands often with soap and water  Wash your hands after you use the bathroom, change a child's diaper, or sneeze  Wash your hands before you prepare or eat food  Stay away from people who are sick  Some germs spread easily and quickly through contact  Follow up with your doctor as directed: You may be referred to an ear, nose, and throat specialist  Write down your questions so you remember to ask them during your visits  © Copyright [facility] [employer] 2022 Information is for End User's use only and may not be sold, redistributed or otherwise used for commercial purposes  The above information is an  only  It is not intended as medical advice for individual conditions or treatments  Talk to your doctor, nurse or pharmacist before following any medical regimen to see if it is safe and effective for you

## 2023-08-01 ENCOUNTER — TELEPHONE (OUTPATIENT)
Dept: OBGYN CLINIC | Facility: MEDICAL CENTER | Age: 28
End: 2023-08-01

## 2023-08-01 DIAGNOSIS — Z32.01 POSITIVE PREGNANCY TEST: Primary | ICD-10-CM

## 2023-08-01 NOTE — TELEPHONE ENCOUNTER
Patient had a positive pregnancy test. Her LMP was 6/3/2023. She would be 8w3d. Patient advised that labs would be ordered and could go ASAP.

## 2023-08-03 ENCOUNTER — LAB (OUTPATIENT)
Dept: LAB | Facility: MEDICAL CENTER | Age: 28
End: 2023-08-03
Payer: COMMERCIAL

## 2023-08-03 DIAGNOSIS — Z32.01 POSITIVE PREGNANCY TEST: ICD-10-CM

## 2023-08-03 DIAGNOSIS — Z34.81 PRENATAL CARE, SUBSEQUENT PREGNANCY, FIRST TRIMESTER: ICD-10-CM

## 2023-08-03 LAB
ABO GROUP BLD: NORMAL
B-HCG SERPL-ACNC: ABNORMAL MIU/ML (ref 0–5)
BASOPHILS # BLD AUTO: 0.03 THOUSANDS/ÂΜL (ref 0–0.1)
BASOPHILS NFR BLD AUTO: 0 % (ref 0–1)
BILIRUB UR QL STRIP: NEGATIVE
BLD GP AB SCN SERPL QL: NEGATIVE
CLARITY UR: CLEAR
COLOR UR: NORMAL
EOSINOPHIL # BLD AUTO: 0.04 THOUSAND/ÂΜL (ref 0–0.61)
EOSINOPHIL NFR BLD AUTO: 0 % (ref 0–6)
ERYTHROCYTE [DISTWIDTH] IN BLOOD BY AUTOMATED COUNT: 12.3 % (ref 11.6–15.1)
GLUCOSE UR STRIP-MCNC: NEGATIVE MG/DL
HBV SURFACE AB SER-ACNC: <3 MIU/ML
HBV SURFACE AG SER QL: NORMAL
HCT VFR BLD AUTO: 40 % (ref 34.8–46.1)
HGB BLD-MCNC: 13.7 G/DL (ref 11.5–15.4)
HGB UR QL STRIP.AUTO: NEGATIVE
HIV 1+2 AB+HIV1 P24 AG SERPL QL IA: NORMAL
HIV 2 AB SERPL QL IA: NORMAL
HIV1 AB SERPL QL IA: NORMAL
HIV1 P24 AG SERPL QL IA: NORMAL
IMM GRANULOCYTES # BLD AUTO: 0.05 THOUSAND/UL (ref 0–0.2)
IMM GRANULOCYTES NFR BLD AUTO: 1 % (ref 0–2)
KETONES UR STRIP-MCNC: NEGATIVE MG/DL
LEUKOCYTE ESTERASE UR QL STRIP: NEGATIVE
LYMPHOCYTES # BLD AUTO: 1.5 THOUSANDS/ÂΜL (ref 0.6–4.47)
LYMPHOCYTES NFR BLD AUTO: 15 % (ref 14–44)
MCH RBC QN AUTO: 30.4 PG (ref 26.8–34.3)
MCHC RBC AUTO-ENTMCNC: 34.3 G/DL (ref 31.4–37.4)
MCV RBC AUTO: 89 FL (ref 82–98)
MONOCYTES # BLD AUTO: 0.45 THOUSAND/ÂΜL (ref 0.17–1.22)
MONOCYTES NFR BLD AUTO: 5 % (ref 4–12)
NEUTROPHILS # BLD AUTO: 7.66 THOUSANDS/ÂΜL (ref 1.85–7.62)
NEUTS SEG NFR BLD AUTO: 79 % (ref 43–75)
NITRITE UR QL STRIP: NEGATIVE
NRBC BLD AUTO-RTO: 0 /100 WBCS
PH UR STRIP.AUTO: 6.5 [PH]
PLATELET # BLD AUTO: 302 THOUSANDS/UL (ref 149–390)
PMV BLD AUTO: 10 FL (ref 8.9–12.7)
PROGEST SERPL-MCNC: 16.95 NG/ML
PROT UR STRIP-MCNC: NEGATIVE MG/DL
RBC # BLD AUTO: 4.51 MILLION/UL (ref 3.81–5.12)
RH BLD: POSITIVE
RUBV IGG SERPL IA-ACNC: 53.9 IU/ML
SP GR UR STRIP.AUTO: 1.01 (ref 1–1.03)
SPECIMEN EXPIRATION DATE: NORMAL
TREPONEMA PALLIDUM IGG+IGM AB [PRESENCE] IN SERUM OR PLASMA BY IMMUNOASSAY: NORMAL
UROBILINOGEN UR STRIP-ACNC: <2 MG/DL
VZV IGG SER QL IA: ABNORMAL
WBC # BLD AUTO: 9.73 THOUSAND/UL (ref 4.31–10.16)

## 2023-08-03 PROCEDURE — 87086 URINE CULTURE/COLONY COUNT: CPT

## 2023-08-03 PROCEDURE — 86901 BLOOD TYPING SEROLOGIC RH(D): CPT

## 2023-08-03 PROCEDURE — 86850 RBC ANTIBODY SCREEN: CPT

## 2023-08-03 PROCEDURE — 86787 VARICELLA-ZOSTER ANTIBODY: CPT

## 2023-08-03 PROCEDURE — 87389 HIV-1 AG W/HIV-1&-2 AB AG IA: CPT

## 2023-08-03 PROCEDURE — 86706 HEP B SURFACE ANTIBODY: CPT

## 2023-08-03 PROCEDURE — 86900 BLOOD TYPING SEROLOGIC ABO: CPT

## 2023-08-03 PROCEDURE — 84702 CHORIONIC GONADOTROPIN TEST: CPT

## 2023-08-03 PROCEDURE — 85025 COMPLETE CBC W/AUTO DIFF WBC: CPT

## 2023-08-03 PROCEDURE — 84144 ASSAY OF PROGESTERONE: CPT

## 2023-08-03 PROCEDURE — 87340 HEPATITIS B SURFACE AG IA: CPT

## 2023-08-03 PROCEDURE — 36415 COLL VENOUS BLD VENIPUNCTURE: CPT

## 2023-08-03 PROCEDURE — 86780 TREPONEMA PALLIDUM: CPT

## 2023-08-03 PROCEDURE — 86762 RUBELLA ANTIBODY: CPT

## 2023-08-03 NOTE — RESULT ENCOUNTER NOTE
Please call patient with results. Labs consistent with early pregnancy. 8w5d by LMP. Needs US for pregnancy dating/location. Can complete here or with radiology within next 3wks. PN panel is resulting, takes a few days for everything to finish but I wanted her to get started with US while we wait.

## 2023-08-04 DIAGNOSIS — Z32.01 POSITIVE PREGNANCY TEST: Primary | ICD-10-CM

## 2023-08-04 LAB — BACTERIA UR CULT: NORMAL

## 2023-08-15 ENCOUNTER — HOSPITAL ENCOUNTER (OUTPATIENT)
Dept: ULTRASOUND IMAGING | Facility: HOSPITAL | Age: 28
Discharge: HOME | End: 2023-08-15
Attending: OBSTETRICS & GYNECOLOGY
Payer: COMMERCIAL

## 2023-08-15 DIAGNOSIS — Z32.01 POSITIVE PREGNANCY TEST: ICD-10-CM

## 2023-08-15 PROCEDURE — 76801 OB US < 14 WKS SINGLE FETUS: CPT

## 2023-08-25 ENCOUNTER — INITIAL PRENATAL (OUTPATIENT)
Dept: OBGYN CLINIC | Facility: MEDICAL CENTER | Age: 28
End: 2023-08-25

## 2023-08-25 VITALS
DIASTOLIC BLOOD PRESSURE: 68 MMHG | SYSTOLIC BLOOD PRESSURE: 102 MMHG | BODY MASS INDEX: 23.97 KG/M2 | HEIGHT: 64 IN | WEIGHT: 140.4 LBS

## 2023-08-25 DIAGNOSIS — Z34.81 PRENATAL CARE, SUBSEQUENT PREGNANCY, FIRST TRIMESTER: Primary | ICD-10-CM

## 2023-08-25 PROCEDURE — OBC

## 2023-08-25 NOTE — PROGRESS NOTES
OB History    Para Term  AB Living   3 1 1   1 1   SAB IAB Ectopic Multiple Live Births   1     0 1      # Outcome Date GA Lbr Eron/2nd Weight Sex Delivery Anes PTL Lv   3 Current            2 SAB 2023           1 Term 20 40w3d / 00:23 3745 g (8 lb 4.1 oz) M Vag-Spont EPI N SHERWIN         * Pt presents for OB intake  *  *Pt's LMP was Patient's last menstrual period was 2023. *Ultrasound date:08/15/23   9weeks 4days  *Estimated date of delivery: 03/15/24   * confirmed by US    *Signs/Symptoms of Pregnancy   *no Constipation    *no Headaches   *no Cramping  *no Spotting   *yes Nausea and Vomiting      Diabetes     *no Hx of GDM    *no BMI >35    *no First degree relative with type 2 diabetes    *no Hx of PCOS     Hypertension-    *no Hx of chronic HTN    *no Hx of gestational HTN   *no hx of preeclampsia, eclampsia, or HELLP syndrome     *Infection Screening   *no Does the pt have a hx of MRSA? *no History of herpes? *Immunizations:   *yes Discussed influenza vaccine    *yes Discussed TDaP vaccine   *yes COVID Vaccine x2    [address]  Depression *no   Anxiety*yes  Medications*no  -Coping well    *Interview education   *Handouts given:    *Baby and Me support center     *Lab Locations    * [employer] Childbirth and Parenting Classes    *Schedule for Prenatal Visits    *Pregnancy Warning Signs Reviewed    *Safe Medications During Pregnancy    *CPT Code Sheet/MFM 13week NT pamphlet    *[employer]      SBIRT Screen:  Depression Screening Follow-up Plan: Patient's depression screening was negative with an [place] score of 4.          *[facility]   *yes discussed genetic testing- pt not interested   Patient has been informed of basic prenatal advice such as avoiding alcohol, drugs, and smoking. She should remain hydrated and take daily prenatal vitamins.  Patient should avoid caffeine, raw sprouts, high mercury fish, undercooked fish, raw eggs, organ meat, unwashed produce, and unpasteurized cheeses, milk, and fruit juice and undercooked meats. She has been informed about toxoplasmosis and to avoid cat feces. *Details that I feel the provider should be aware of:  Kamlesh Borjas came in for her OB intake at 11w. Patient c/o occasional nausea and vomiting mostly in the morning. Pregnancy medication list reviewed. Prenatal panel ordered and MFM referral placed. PN1 visit scheduled for *9/8. The patient was oriented to our practice and all questions were answered.     Interviewed by:  Woo Weldon RN

## 2023-09-08 ENCOUNTER — INITIAL PRENATAL (OUTPATIENT)
Dept: OBGYN CLINIC | Facility: MEDICAL CENTER | Age: 28
End: 2023-09-08

## 2023-09-08 VITALS — BODY MASS INDEX: 24 KG/M2 | SYSTOLIC BLOOD PRESSURE: 118 MMHG | WEIGHT: 139.8 LBS | DIASTOLIC BLOOD PRESSURE: 70 MMHG

## 2023-09-08 DIAGNOSIS — Z12.4 ENCOUNTER FOR SCREENING FOR CERVICAL CANCER: Primary | ICD-10-CM

## 2023-09-08 DIAGNOSIS — Z3A.13 13 WEEKS GESTATION OF PREGNANCY: ICD-10-CM

## 2023-09-08 PROCEDURE — G0145 SCR C/V CYTO,THINLAYER,RESCR: HCPCS | Performed by: OBSTETRICS & GYNECOLOGY

## 2023-09-08 PROCEDURE — PNV: Performed by: OBSTETRICS & GYNECOLOGY

## 2023-09-08 PROCEDURE — 87491 CHLMYD TRACH DNA AMP PROBE: CPT | Performed by: OBSTETRICS & GYNECOLOGY

## 2023-09-08 PROCEDURE — 87591 N.GONORRHOEAE DNA AMP PROB: CPT | Performed by: OBSTETRICS & GYNECOLOGY

## 2023-09-08 NOTE — PROGRESS NOTES
Initial Prenatal Visit  [facility] [address] [place] [address]    Assessment/Plan:  Monica Porter is a 34y.o. year old  at Unknown who presents for initial prenatal visit. Supervision of normal pregnancy  - Prenatal labs reviewed and normal.  Blood type: B+  - Aneuploidy screening discussed. Patient opts for sequential aneuploidy screening.  - Routine cervical cancer screening: Pap Done today. - Routine STI Screening: GC/Chlamydia sent today. HIV/Hep B/Syphilis ordered in prenatal panel.  - Patient Education: Patient was counseled regarding diet, exercise, weight gain, foods to avoid, vaccines in pregnancy, aneuploidy screening, travel precautions to include seat belt use and VTE risk reduction. She has been provided our pregnancy packet which includes how and when to contact providers, medication recommendations, dietary suggestions, breastfeeding information as well as websites for additional information, hospital and delivery concerns. Additional Pregnancy Problems:   1. Encounter for screening for cervical cancer    2. 13 weeks gestation of pregnancy      Subjective:   CC:  Desires prenatal care  Julianne Hargrove is a 34 y.o.  female who presents for prenatal care. Pregnancy ROS: no pelvic pain or vaginal bleeding. Some nausea, no vomiting nausea/vomiting. Aware varicella and hep B nonimmune     The following portions of the patient's history were reviewed and updated as appropriate: allergies, current medications, past family history, past medical history, obstetric history, gynecologic history, past social history, past surgical history and problem list.      Objective:  /70   Wt 63.4 kg (139 lb 12.8 oz)   LMP 2023   BMI 24.00 kg/m²   Pregravid Weight/BMI: Pregravid weight not on file (BMI Could not be calculated)  Current Weight: 63.4 kg (139 lb 12.8 oz)   Total Weight Gain: Not found.    Pre- Vitals    Flowsheet Row Most Recent Value   Prenatal Assessment    Fetal Heart Rate 165   Prenatal Vitals    Blood Pressure 118/70   Weight - Scale 63.4 kg (139 lb 12.8 oz)   Urine Albumin/Glucose    Dilation/Effacement/Station    Vaginal Drainage    Draining Fluid No   Edema          General: Well appearing, no distress  Respiratory: Normal respiratory rate, lungs clear to auscultation, no wheezing or rales  Cardiovascular: Regular rate and rhythm, no murmurs, rubs, or gallops  Breasts: Normal bilaterally, nontender without masses, asymmetry, or nipple discharge. Abdomen: Soft, gravid, nontender  : Urethra normal. Normal labia majora and minora. Vagina normal.  No vaginal bleeding. No vaginal discharge. Cervix visually closed. Extremities: Warm and well perfused. Non tender. No edema.

## 2023-09-12 LAB
C TRACH DNA SPEC QL NAA+PROBE: NEGATIVE
N GONORRHOEA DNA SPEC QL NAA+PROBE: NEGATIVE

## 2023-09-13 NOTE — PROGRESS NOTES
Please refer to the [facility] ultrasound report in Ob Procedures for additional information regarding today's visit

## 2023-09-14 ENCOUNTER — ROUTINE PRENATAL (OUTPATIENT)
Dept: PERINATAL CARE | Facility: OTHER | Age: 28
End: 2023-09-14
Payer: COMMERCIAL

## 2023-09-14 VITALS
WEIGHT: 140.4 LBS | SYSTOLIC BLOOD PRESSURE: 108 MMHG | HEIGHT: 64 IN | BODY MASS INDEX: 23.97 KG/M2 | DIASTOLIC BLOOD PRESSURE: 60 MMHG | HEART RATE: 75 BPM

## 2023-09-14 DIAGNOSIS — Q79.8 POLAND SYNDROME: ICD-10-CM

## 2023-09-14 DIAGNOSIS — Z36.82 ENCOUNTER FOR ANTENATAL SCREENING FOR NUCHAL TRANSLUCENCY: Primary | ICD-10-CM

## 2023-09-14 DIAGNOSIS — Z3A.13 13 WEEKS GESTATION OF PREGNANCY: ICD-10-CM

## 2023-09-14 LAB
LAB AP GYN PRIMARY INTERPRETATION: NORMAL
Lab: NORMAL

## 2023-09-14 PROCEDURE — 76813 OB US NUCHAL MEAS 1 GEST: CPT | Performed by: OBSTETRICS & GYNECOLOGY

## 2023-09-14 PROCEDURE — 76801 OB US < 14 WKS SINGLE FETUS: CPT | Performed by: OBSTETRICS & GYNECOLOGY

## 2023-09-14 NOTE — LETTER
September 14, 2023     Telma Gaines MD  [facility]    Patient: Soniya Ontiveros   YOB: 1995   Date of Visit: 9/14/2023       Dear Dr. Ivy Weinstein:    Thank you for referring Nohelia Paez to me for evaluation. Below are my notes for this consultation. If you have questions, please do not hesitate to call me. I look forward to following your patient along with you.          Sincerely,        Reyes Wood MD        CC: No Recipients    Reyes Wood MD  9/13/2023 12:52 PM  Sign when Signing Visit  Please refer to the [facility] ultrasound report in Ob Procedures for additional information regarding today's visit

## 2023-10-10 ENCOUNTER — ROUTINE PRENATAL (OUTPATIENT)
Dept: OBGYN CLINIC | Facility: MEDICAL CENTER | Age: 28
End: 2023-10-10

## 2023-10-10 VITALS — BODY MASS INDEX: 24.75 KG/M2 | SYSTOLIC BLOOD PRESSURE: 110 MMHG | WEIGHT: 144.2 LBS | DIASTOLIC BLOOD PRESSURE: 68 MMHG

## 2023-10-10 DIAGNOSIS — Z34.92 SECOND TRIMESTER PREGNANCY: Primary | ICD-10-CM

## 2023-10-10 DIAGNOSIS — Z3A.17 17 WEEKS GESTATION OF PREGNANCY: ICD-10-CM

## 2023-10-10 PROCEDURE — PNV: Performed by: OBSTETRICS & GYNECOLOGY

## 2023-10-10 NOTE — PROGRESS NOTES
Assessment  34 y.o.  at 17w4d presenting for routine prenatal visit. Plan  Diagnoses and all orders for this visit:    Second trimester pregnancy  17 weeks gestation of pregnancy  - Second trimester precautions  - Declined NIPT  - Reviewed AFP; declines  - Level 2 scheduled  - Return in 4wks for PN    ____________________________________________________________        Subjective    Hannah Patel is a 34 y.o.  at 17w4d who presents for routine prenatal visit. She is without complaint. She denies contractions, loss of fluid, or vaginal bleeding. She feels psb early fetal movements. Pregnancy Problems:  Patient Active Problem List   Diagnosis   • Dysmenorrhea   • Migraine   • Anxiety   • Anemia   •  (spontaneous vaginal delivery)         Objective  /68   Wt 65.4 kg (144 lb 3.2 oz)   LMP 2023   BMI 24.75 kg/m²     FHT: 152 BPM   Uterine Size: size equals dates     Physical Exam:  Physical Exam  Constitutional:       General: She is not in acute distress. Appearance: Normal appearance. She is well-developed. She is not ill-appearing, toxic-appearing or diaphoretic. HENT:      Head: Normocephalic and atraumatic. Eyes:      General: No scleral icterus. Right eye: No discharge. Left eye: No discharge. Conjunctiva/sclera: Conjunctivae normal.   Pulmonary:      Effort: Pulmonary effort is normal. No accessory muscle usage or respiratory distress. Abdominal:      General: There is distension (gravid). Tenderness: There is no abdominal tenderness. There is no guarding or rebound. Skin:     General: Skin is warm and dry. Coloration: Skin is not jaundiced. Findings: No bruising, erythema or rash. Neurological:      Mental Status: She is alert. Psychiatric:         Mood and Affect: Mood normal.         Behavior: Behavior normal.         Thought Content:  Thought content normal.         Judgment: Judgment normal.

## 2023-10-27 ENCOUNTER — ROUTINE PRENATAL (OUTPATIENT)
Dept: PERINATAL CARE | Facility: OTHER | Age: 28
End: 2023-10-27
Payer: COMMERCIAL

## 2023-10-27 VITALS
BODY MASS INDEX: 24.28 KG/M2 | SYSTOLIC BLOOD PRESSURE: 102 MMHG | HEIGHT: 64 IN | WEIGHT: 142.2 LBS | HEART RATE: 96 BPM | DIASTOLIC BLOOD PRESSURE: 60 MMHG

## 2023-10-27 DIAGNOSIS — Z36.86 ENCOUNTER FOR ANTENATAL SCREENING FOR CERVICAL LENGTH: ICD-10-CM

## 2023-10-27 DIAGNOSIS — Z36.3 ENCOUNTER FOR ANTENATAL SCREENING FOR MALFORMATIONS: Primary | ICD-10-CM

## 2023-10-27 PROCEDURE — 76817 TRANSVAGINAL US OBSTETRIC: CPT | Performed by: OBSTETRICS & GYNECOLOGY

## 2023-10-27 PROCEDURE — 76805 OB US >/= 14 WKS SNGL FETUS: CPT | Performed by: OBSTETRICS & GYNECOLOGY

## 2023-10-27 NOTE — PROGRESS NOTES
Ultrasound Probe Disinfection    A transvaginal ultrasound was performed. Prior to use, disinfection was performed with High Level Disinfection Process (Trophon). Probe serial number [id number]: [id number]  was used.     Chaperone: CY Us RDMS

## 2023-10-27 NOTE — PATIENT INSTRUCTIONS
Thank you for choosing us for your  care today. If you have any questions about your ultrasound or care, please do not hesitate to contact us or your primary obstetrician. Some general instructions for your pregnancy are:    Exercise: Aim for 22 minutes per day (150 minutes per week) of regular exercise. Walking is great! Nutrition: Choose healthy sources of calcium, iron, and protein. Learn about Preeclampsia: preeclampsia is a common, serious high blood pressure complication in pregnancy. A blood pressure of 599ZJRC (systolic or top number) or 05GGXL (diastolic or bottom number) is not normal and needs evaluation by your doctor. Aspirin is sometimes prescribed in early pregnancy to prevent preeclampsia in women with risk factors - ask your obstetrician if you should be on this medication. For more resources, visit:  MapCoverage[employer]fi  If you smoke, try to reduce how many cigarettes you smoke or try to quit completely. Do not vape. Other warning signs to watch out for in pregnancy or postpartum: chest pain, obstructed breathing or shortness of breath, seizures, thoughts of hurting yourself or your baby, bleeding, a painful or swollen leg, fever, or headache (see [employer] POST-BIRTH Warning Signs campaign). If these happen call 911. Itching is also not normal in pregnancy and if you experience this, especially over your hands and feet, potentially worse at night, notify your doctors.

## 2023-10-27 NOTE — PROGRESS NOTES
[facility]: Ms. Nemo French was seen today for anatomic survey and cervical length screening ultrasound. See ultrasound report under "OB Procedures" tab. The time spent on this established patient on the encounter date included 5 minutes previsit service time reviewing records and precharting, 5 minutes face-to-face service time counseling regarding results and coordinating care, and  4 minutes charting, totalling 14 minutes. Please don't hesitate to contact our office with any concerns or questions.   Tatyana Martinez MD

## 2023-11-03 PROBLEM — D64.9 ANEMIA: Status: RESOLVED | Noted: 2020-08-06 | Resolved: 2023-11-03

## 2023-11-03 PROBLEM — Z3A.21 21 WEEKS GESTATION OF PREGNANCY: Status: ACTIVE | Noted: 2023-11-03

## 2023-11-04 NOTE — PROGRESS NOTES
Problem List Items Addressed This Visit          Other    21 weeks gestation of pregnancy - Primary     Mfm on 10/27/23- reviewed   No genetic testing

## 2023-11-06 ENCOUNTER — ROUTINE PRENATAL (OUTPATIENT)
Dept: OBGYN CLINIC | Facility: MEDICAL CENTER | Age: 28
End: 2023-11-06

## 2023-11-06 VITALS — BODY MASS INDEX: 25.39 KG/M2 | WEIGHT: 147.9 LBS | DIASTOLIC BLOOD PRESSURE: 60 MMHG | SYSTOLIC BLOOD PRESSURE: 102 MMHG

## 2023-11-06 DIAGNOSIS — Z3A.21 21 WEEKS GESTATION OF PREGNANCY: Primary | ICD-10-CM

## 2023-11-06 PROCEDURE — PNV: Performed by: OBSTETRICS & GYNECOLOGY

## 2023-12-05 ENCOUNTER — ROUTINE PRENATAL (OUTPATIENT)
Dept: OBGYN CLINIC | Facility: MEDICAL CENTER | Age: 28
End: 2023-12-05

## 2023-12-05 VITALS — SYSTOLIC BLOOD PRESSURE: 108 MMHG | WEIGHT: 150 LBS | DIASTOLIC BLOOD PRESSURE: 60 MMHG | BODY MASS INDEX: 25.75 KG/M2

## 2023-12-05 DIAGNOSIS — Z34.92 SECOND TRIMESTER PREGNANCY: ICD-10-CM

## 2023-12-05 DIAGNOSIS — Z3A.25 25 WEEKS GESTATION OF PREGNANCY: Primary | ICD-10-CM

## 2023-12-05 PROCEDURE — PNV: Performed by: OBSTETRICS & GYNECOLOGY

## 2023-12-05 NOTE — PROGRESS NOTES
Luís Sanchez is a 34y.o. year old  at 21w2d for routine prenatal visit.   + FM, no vaginal bleeding, contractions, or LOF  Complaints: No   Most recent ultrasound and labs reviewed.   Order for 28 week labs given   Declined flu

## 2023-12-05 NOTE — PATIENT INSTRUCTIONS
Kick Counts in Pregnancy   WHAT YOU NEED TO KNOW:   Kick counts measure how much your baby is moving in your womb. A kick from your baby can be felt as a twist, turn, swish, roll, or jab. It is common to feel your baby kicking at 26 to 28 weeks of pregnancy. You may feel your baby kick as early as 20 weeks of pregnancy. You may want to start counting at 28 weeks. DISCHARGE INSTRUCTIONS:   Contact your doctor immediately if:   You feel a change in the number of kicks or movements of your baby. You feel fewer than 10 kicks within 2 hours. You have questions or concerns about your baby's movements. Why measure kick counts:  Your baby's movement may provide information about your baby's health. He or she may move less, or not at all, if there are problems. Your baby may move less if he or she is not getting enough oxygen or nutrition from the placenta. Do not smoke while you are pregnant. Smoking decreases the amount of oxygen that gets to your baby. Talk to your healthcare provider if you need help to quit smoking. Tell your healthcare provider as soon as you feel a change in your baby's movements. When to measure kick counts:   Measure kick counts at the same time every day. Measure kick counts when your baby is awake and most active. Your baby may be most active in the evening. How to measure kick counts:  Check that your baby is awake before you measure kick counts. You can wake up your baby by lightly pushing on your belly, walking, or drinking something cold. Your healthcare provider may tell you different ways to measure kick counts. You may be told to do the following:  Use a chart or clock to keep track of the time you start and finish counting. Sit in a chair or lie on your left side. Place your hands on the largest part of your belly. Count until you reach 10 kicks. Write down how much time it takes to count 10 kicks. It may take 30 minutes to 2 hours to count 10 kicks. It should not take more than 2 hours to count 10 kicks. Follow up with your doctor as directed:  Write down your questions so you remember to ask them during your visits. © Copyright Laurent Agarwal 2023 Information is for End User's use only and may not be sold, redistributed or otherwise used for commercial purposes. The above information is an  only. It is not intended as medical advice for individual conditions or treatments. Talk to your doctor, nurse or pharmacist before following any medical regimen to see if it is safe and effective for you.

## 2023-12-15 ENCOUNTER — TELEPHONE (OUTPATIENT)
Dept: OBGYN CLINIC | Facility: MEDICAL CENTER | Age: 28
End: 2023-12-15

## 2023-12-15 NOTE — TELEPHONE ENCOUNTER
Spoke to pt. States she tested positive for Covid 19 last night (12/14/23). Has the following symptoms - Fever( got better), Congestions, Migraine, and cough that started yesterday. Gave pts pregnancy precautions and what to take for her symptoms. Asked pt to call us right away if symptoms worsen.

## 2023-12-20 ENCOUNTER — LAB (OUTPATIENT)
Dept: LAB | Facility: MEDICAL CENTER | Age: 28
End: 2023-12-20
Payer: COMMERCIAL

## 2023-12-20 DIAGNOSIS — Z34.92 SECOND TRIMESTER PREGNANCY: ICD-10-CM

## 2023-12-20 LAB
BASOPHILS # BLD AUTO: 0.03 THOUSANDS/ÂΜL (ref 0–0.1)
BASOPHILS NFR BLD AUTO: 0 % (ref 0–1)
EOSINOPHIL # BLD AUTO: 0.03 THOUSAND/ÂΜL (ref 0–0.61)
EOSINOPHIL NFR BLD AUTO: 0 % (ref 0–6)
ERYTHROCYTE [DISTWIDTH] IN BLOOD BY AUTOMATED COUNT: 12 % (ref 11.6–15.1)
GLUCOSE 1H P 50 G GLC PO SERPL-MCNC: 87 MG/DL (ref 40–134)
HCT VFR BLD AUTO: 34.1 % (ref 34.8–46.1)
HCV AB SER QL: NORMAL
HGB BLD-MCNC: 11.5 G/DL (ref 11.5–15.4)
IMM GRANULOCYTES # BLD AUTO: 0.04 THOUSAND/UL (ref 0–0.2)
IMM GRANULOCYTES NFR BLD AUTO: 1 % (ref 0–2)
LYMPHOCYTES # BLD AUTO: 1.84 THOUSANDS/ÂΜL (ref 0.6–4.47)
LYMPHOCYTES NFR BLD AUTO: 23 % (ref 14–44)
MCH RBC QN AUTO: 30 PG (ref 26.8–34.3)
MCHC RBC AUTO-ENTMCNC: 33.7 G/DL (ref 31.4–37.4)
MCV RBC AUTO: 89 FL (ref 82–98)
MONOCYTES # BLD AUTO: 0.43 THOUSAND/ÂΜL (ref 0.17–1.22)
MONOCYTES NFR BLD AUTO: 5 % (ref 4–12)
NEUTROPHILS # BLD AUTO: 5.58 THOUSANDS/ÂΜL (ref 1.85–7.62)
NEUTS SEG NFR BLD AUTO: 71 % (ref 43–75)
NRBC BLD AUTO-RTO: 0 /100 WBCS
PLATELET # BLD AUTO: 232 THOUSANDS/UL (ref 149–390)
PMV BLD AUTO: 10 FL (ref 8.9–12.7)
RBC # BLD AUTO: 3.83 MILLION/UL (ref 3.81–5.12)
TREPONEMA PALLIDUM IGG+IGM AB [PRESENCE] IN SERUM OR PLASMA BY IMMUNOASSAY: NORMAL
WBC # BLD AUTO: 7.95 THOUSAND/UL (ref 4.31–10.16)

## 2023-12-20 PROCEDURE — 86803 HEPATITIS C AB TEST: CPT

## 2023-12-20 PROCEDURE — 82950 GLUCOSE TEST: CPT

## 2023-12-20 PROCEDURE — 86780 TREPONEMA PALLIDUM: CPT

## 2023-12-20 PROCEDURE — 85025 COMPLETE CBC W/AUTO DIFF WBC: CPT

## 2023-12-20 PROCEDURE — 36415 COLL VENOUS BLD VENIPUNCTURE: CPT

## 2023-12-22 ENCOUNTER — ROUTINE PRENATAL (OUTPATIENT)
Dept: OBGYN CLINIC | Facility: MEDICAL CENTER | Age: 28
End: 2023-12-22
Payer: COMMERCIAL

## 2023-12-22 VITALS — BODY MASS INDEX: 25.88 KG/M2 | SYSTOLIC BLOOD PRESSURE: 120 MMHG | WEIGHT: 150.8 LBS | DIASTOLIC BLOOD PRESSURE: 72 MMHG

## 2023-12-22 DIAGNOSIS — Z23 ENCOUNTER FOR IMMUNIZATION: ICD-10-CM

## 2023-12-22 DIAGNOSIS — Z34.83 ENCOUNTER FOR SUPERVISION OF OTHER NORMAL PREGNANCY IN THIRD TRIMESTER: Primary | ICD-10-CM

## 2023-12-22 DIAGNOSIS — Z3A.28 28 WEEKS GESTATION OF PREGNANCY: ICD-10-CM

## 2023-12-22 PROCEDURE — 90471 IMMUNIZATION ADMIN: CPT

## 2023-12-22 PROCEDURE — 90715 TDAP VACCINE 7 YRS/> IM: CPT

## 2023-12-22 PROCEDURE — PNV: Performed by: OBSTETRICS & GYNECOLOGY

## 2023-12-22 NOTE — PATIENT INSTRUCTIONS
Fetal Movement   WHAT YOU NEED TO KNOW:   Fetal movements are the kicks, rolls, and hiccups of your unborn baby. You may start to feel these movements when you are 20 weeks pregnant. The movements grow stronger and more frequent as your baby grows. Fetal movements show that your unborn baby is getting the oxygen and nutrients he or she needs before birth. Fewer fetal movements may signal a problem with your baby's health.  DISCHARGE INSTRUCTIONS:   Follow up with your doctor or obstetrician as directed:  Write down your questions so you remember to ask them during your visits.  Normal fetal movement:  Fetal activity can be described by 4 states, from least to most active. During quiet sleep, your unborn baby may be still for up to 2 hours. During active sleep, he or she kicks, rolls, and moves often. During the quiet awake state, he or she may only move his or her eyes. The active awake state includes strong kicks and rolls.  What affects fetal movement:  You may feel your baby move more after you eat, or after you drink caffeine. You may feel your baby move less while you are more active, such as when you exercise. You may also feel fewer movements if you are obese. Certain medicines can change your baby's movements. Tell your healthcare provider about the medicines you are taking.  Track fetal movements at home:  Fetal movement is most often felt when you lie quietly on your side. Your healthcare provider may ask you to count movements for 2 hours. He or she may ask you to track how long it takes for your baby to move 10 times. Keep a log of your baby's movements.  Contact your doctor or obstetrician if:   It takes longer than usual to feel 10 of your unborn baby's movements.    You do not feel your unborn baby move at least 10 times in 2 hours.    The skin on your hands, feet, and around your eyes is more swollen than usual.    You have a headache for at least 24 hours.    Tiny red dots appear on your  skin.    Your belly is tender when you press on it.    You have questions or concerns about your condition or care.    Return to the emergency department if:   You do not feel your unborn baby move for 12 hours.    You feel cramping or constant pain in your abdomen.    You have heavy bleeding from your vagina.    You have a severe headache and cannot see clearly.    You are having trouble breathing or are vomiting.    You have a seizure.    © Copyright [employer] 2023 Information is for End User's use only and may not be sold, redistributed or otherwise used for commercial purposes.  The above information is an  only. It is not intended as medical advice for individual conditions or treatments. Talk to your doctor, nurse or pharmacist before following any medical regimen to see if it is safe and effective for you.

## 2024-01-05 ENCOUNTER — ROUTINE PRENATAL (OUTPATIENT)
Dept: OBGYN CLINIC | Facility: MEDICAL CENTER | Age: 29
End: 2024-01-05

## 2024-01-05 VITALS — SYSTOLIC BLOOD PRESSURE: 118 MMHG | BODY MASS INDEX: 25.92 KG/M2 | DIASTOLIC BLOOD PRESSURE: 74 MMHG | WEIGHT: 151 LBS

## 2024-01-05 DIAGNOSIS — Z3A.30 30 WEEKS GESTATION OF PREGNANCY: ICD-10-CM

## 2024-01-05 DIAGNOSIS — Z34.00 SUPERVISION OF NORMAL FIRST PREGNANCY, ANTEPARTUM: Primary | ICD-10-CM

## 2024-01-05 PROCEDURE — PNV: Performed by: STUDENT IN AN ORGANIZED HEALTH CARE EDUCATION/TRAINING PROGRAM

## 2024-01-05 NOTE — PROGRESS NOTES
Routine Prenatal Visit  [facility] [address], [place], [place]    Assessment/Plan:  Taylor is a 29 y.o. year old  at 30w0d who presents for routine prenatal visit.     1. Supervision of normal first pregnancy, antepartum    2. 30 weeks gestation of pregnancy          Subjective:     CC: Prenatal care    Taylor Cruz is a 29 y.o.  female who presents for routine prenatal care at 30w0d.  Pregnancy ROS: Denies leakage of fluid, pelvic pain, or vaginal bleeding.  Reports normal fetal movement.    The following portions of the patient's history were reviewed and updated as appropriate: allergies, current medications, past family history, past medical history, obstetric history, gynecologic history, past social history, past surgical history and problem list.      Objective:  /74   Wt 68.5 kg (151 lb)   LMP 2023   BMI 25.92 kg/m²   Pregravid Weight/BMI: 64 kg (141 lb) (BMI 24.19)  Current Weight: 68.5 kg (151 lb)   Total Weight Gain: 4.536 kg (10 lb)   Pre- Vitals      Flowsheet Row Most Recent Value   Prenatal Assessment    Fetal Heart Rate 138   Movement Present   Prenatal Vitals    Blood Pressure 118/74   Weight - Scale 68.5 kg (151 lb)   Urine Albumin/Glucose    Dilation/Effacement/Station    Vaginal Drainage    Draining Fluid No   Edema    LLE Edema None   RLE Edema None   Facial Edema None             General: Well appearing, no distress  Respiratory: Unlabored breathing  Cardiovascular: Regular rate.  Abdomen: Soft, gravid, nontender  Fundal Height: Appropriate for gestational age.  Extremities: Warm and well perfused.  Non tender.

## 2024-01-15 PROBLEM — Z3A.31 31 WEEKS GESTATION OF PREGNANCY: Status: ACTIVE | Noted: 2023-11-03

## 2024-01-19 ENCOUNTER — ROUTINE PRENATAL (OUTPATIENT)
Dept: OBGYN CLINIC | Facility: MEDICAL CENTER | Age: 29
End: 2024-01-19

## 2024-01-19 VITALS — BODY MASS INDEX: 26.23 KG/M2 | WEIGHT: 152.8 LBS | DIASTOLIC BLOOD PRESSURE: 60 MMHG | SYSTOLIC BLOOD PRESSURE: 104 MMHG

## 2024-01-19 DIAGNOSIS — Z34.93 PRENATAL CARE IN THIRD TRIMESTER: Primary | ICD-10-CM

## 2024-01-19 DIAGNOSIS — Z3A.32 32 WEEKS GESTATION OF PREGNANCY: ICD-10-CM

## 2024-01-19 PROCEDURE — PNV: Performed by: NURSE PRACTITIONER

## 2024-01-19 NOTE — PROGRESS NOTES
Denies loss of fluid, vaginal bleeding and abdominal pain.  Confirms frequent fetal movement.  Doing fetal kick counts.  Tolerating prenatal vitamin well.  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  Is requesting note to postpone service.  Reviewed recommendation for RSV.  Patient is considering vaccine.  Denies other questions or concerns at today's visit  BP: 104/60 weight: +11lb  Plan  -Continue prenatal vitamin daily  -Continue fetal kick counts daily  -Jury duty note provided   -Common discomforts of pregnancy and precautions including  labor reviewed.  Signs and symptoms to report reviewed.  RTO 2 weeks f/u PP contraception

## 2024-02-02 ENCOUNTER — ROUTINE PRENATAL (OUTPATIENT)
Dept: OBGYN CLINIC | Facility: MEDICAL CENTER | Age: 29
End: 2024-02-02

## 2024-02-02 VITALS — WEIGHT: 156 LBS | DIASTOLIC BLOOD PRESSURE: 68 MMHG | SYSTOLIC BLOOD PRESSURE: 106 MMHG | BODY MASS INDEX: 26.78 KG/M2

## 2024-02-02 DIAGNOSIS — Z3A.34 34 WEEKS GESTATION OF PREGNANCY: Primary | ICD-10-CM

## 2024-02-02 PROCEDURE — PNV: Performed by: OBSTETRICS & GYNECOLOGY

## 2024-02-02 NOTE — PROGRESS NOTES
Problem List Items Addressed This Visit          Other    34 weeks gestation of pregnancy - Primary     Mfm on 10/27/23- reviewed   No genetic testing

## 2024-02-08 ENCOUNTER — TELEPHONE (OUTPATIENT)
Dept: OBGYN CLINIC | Facility: MEDICAL CENTER | Age: 29
End: 2024-02-08

## 2024-02-16 ENCOUNTER — ROUTINE PRENATAL (OUTPATIENT)
Dept: OBGYN CLINIC | Facility: MEDICAL CENTER | Age: 29
End: 2024-02-16

## 2024-02-16 VITALS — DIASTOLIC BLOOD PRESSURE: 64 MMHG | SYSTOLIC BLOOD PRESSURE: 104 MMHG | WEIGHT: 157 LBS | BODY MASS INDEX: 26.95 KG/M2

## 2024-02-16 DIAGNOSIS — Z3A.36 36 WEEKS GESTATION OF PREGNANCY: ICD-10-CM

## 2024-02-16 DIAGNOSIS — Z34.80 SUPERVISION OF OTHER NORMAL PREGNANCY, ANTEPARTUM: Primary | ICD-10-CM

## 2024-02-16 PROCEDURE — PNV: Performed by: STUDENT IN AN ORGANIZED HEALTH CARE EDUCATION/TRAINING PROGRAM

## 2024-02-16 PROCEDURE — 87150 DNA/RNA AMPLIFIED PROBE: CPT | Performed by: STUDENT IN AN ORGANIZED HEALTH CARE EDUCATION/TRAINING PROGRAM

## 2024-02-16 NOTE — PROGRESS NOTES
Routine Prenatal Visit  [facility] [address], [place], [place]    Assessment/Plan:  Taylor is a 29 y.o. year old  at 36w0d who presents for routine prenatal visit.     1. Supervision of other normal pregnancy, antepartum    2. 36 weeks gestation of pregnancy  Assessment & Plan:  - GBS done today  - Patient desires to await spontaneous labor    Orders:  -     Strep B DNA probe, amplification          Subjective:     CC: Prenatal care    Taylor Cruz is a 29 y.o.  female who presents for routine prenatal care at 36w0d.  Pregnancy ROS: Denies leakage of fluid, pelvic pain, or vaginal bleeding.  Reports normal fetal movement.    The following portions of the patient's history were reviewed and updated as appropriate: allergies, current medications, past family history, past medical history, obstetric history, gynecologic history, past social history, past surgical history and problem list.      Objective:  /64   Wt 71.2 kg (157 lb)   LMP 2023   BMI 26.95 kg/m²   Pregravid Weight/BMI: 64 kg (141 lb) (BMI 24.19)  Current Weight: 71.2 kg (157 lb)   Total Weight Gain: 7.258 kg (16 lb)   Pre- Vitals      Flowsheet Row Most Recent Value   Prenatal Assessment    Fetal Heart Rate 149   Movement Present   Prenatal Vitals    Blood Pressure 104/64   Weight - Scale 71.2 kg (157 lb)   Urine Albumin/Glucose    Dilation/Effacement/Station    Vaginal Drainage    Draining Fluid No   Edema    LLE Edema Trace   RLE Edema Trace   Facial Edema None             General: Well appearing, no distress  Respiratory: Unlabored breathing  Cardiovascular: Regular rate.  Abdomen: Soft, gravid, nontender  Fundal Height: Appropriate for gestational age.  Extremities: Warm and well perfused.  Non tender.

## 2024-02-17 ENCOUNTER — HOSPITAL ENCOUNTER (OUTPATIENT)
Facility: HOSPITAL | Age: 29
Discharge: HOME | End: 2024-02-17
Attending: STUDENT IN AN ORGANIZED HEALTH CARE EDUCATION/TRAINING PROGRAM | Admitting: STUDENT IN AN ORGANIZED HEALTH CARE EDUCATION/TRAINING PROGRAM
Payer: COMMERCIAL

## 2024-02-17 ENCOUNTER — NURSE TRIAGE (OUTPATIENT)
Dept: OTHER | Facility: OTHER | Age: 29
End: 2024-02-17

## 2024-02-17 VITALS — SYSTOLIC BLOOD PRESSURE: 127 MMHG | HEART RATE: 83 BPM | TEMPERATURE: 97.3 F | DIASTOLIC BLOOD PRESSURE: 70 MMHG

## 2024-02-17 PROCEDURE — 99213 OFFICE O/P EST LOW 20 MIN: CPT | Performed by: OBSTETRICS & GYNECOLOGY

## 2024-02-17 PROCEDURE — G0463 HOSPITAL OUTPT CLINIC VISIT: HCPCS

## 2024-02-17 PROCEDURE — 99212 OFFICE O/P EST SF 10 MIN: CPT

## 2024-02-17 NOTE — TELEPHONE ENCOUNTER
"Regardin Weeks Pregnant, Contractions 5 to 6 Minutes Apart.  ----- Message from Rose Mary sent at 2024  6:44 PM EST -----  \" I am 36 Weeks Pregnant and I think I might be having Contractions, 5 to 6 Minutes apart.\"    "

## 2024-02-17 NOTE — TELEPHONE ENCOUNTER
"Reason for Disposition  • [1] MILD abdominal pain (e.g., doesn't interfere with normal activities) or tightening AND [2] constant AND [3] present > 2 hours    Answer Assessment - Initial Assessment Questions  1. LOCATION: \"Where does it hurt?\"       Lower abdomen and lower back    2. RADIATION: \"Does the pain shoot anywhere else?\" (e.g., chest, back)      Denies    3. ONSET: \"When did the pain begin?\" (Minutes, hours or days ago)       1 hour ago    4. ONSET: \"Gradual or sudden onset?\"      Sudden    5. PATTERN: \"Does the pain come and go, or has it been constant since it started?\"       Comes and goes and constantly every 5-6 minutes and lasting for 1 min    6. SEVERITY: \"How bad is the pain?\" \"What does it keep you from doing?\"  (e.g., Scale 1-10; mild, moderate, or severe)    - MILD (1-3): doesn't interfere with normal activities, abdomen soft and not tender to touch     - MODERATE (4-7): interferes with normal activities or awakens from sleep, tender to touch     - SEVERE (8-10): excruciating pain, doubled over, unable to do any normal activities      Moderate: 5/10 a little worse than a menstrual cramp    7. RECURRENT SYMPTOM: \"Have you ever had this type of stomach pain before?\" If Yes, ask: \"When was the last time?\" and \"What happened that time?\"       Similar to labor contractions with prior pregnancy    8. CAUSE: \"What do you think is causing the stomach pain?      Contractions    9. RELIEVING/AGGRAVATING FACTORS: \"What makes it better or worse?\" (e.g., antacids, bowel movement, movement)      Denies    10. FETAL MOVEMENT: \"Has the baby's movement decreased or changed significantly from normal?\"        Denies    11. OTHER SYMPTOMS: \"Has there been any vaginal bleeding, fever, vomiting, diarrhea, or urine problems?\"        Denies    12. TATE: \"What date are you expecting to deliver?\"        3/15/2024    Protocols used: Pregnancy - Abdominal Pain Greater Than 20 Weeks EGA-ADULT-AH    "

## 2024-02-18 LAB — GP B STREP DNA SPEC QL NAA+PROBE: NEGATIVE

## 2024-02-18 NOTE — PROGRESS NOTES
OB Triage Note  Patient of Dr. Becerra [facility]    Subjective:    29 y.o.  at 36w1d with pelvic pressure    CC:  I feel like I have to poop. I felt this way when I was in labor with my first baby so I want to get checked.     HPI:  Contractions:  yes  Fetal movement:  yes  Vaginal bleeding:   no  Leaking of fluid:  no    Pt was seen by Dr. Becerra yesterday for a routine prenatal visit and GBS collection. She was checked at that visit and was 1/2 cm or fingertip.    OB complications:  None    PMH:    Past Medical History:   Diagnosis Date    Migraine     Urinary tract infection     frequent UTI in past        Objective    Vital signs: Blood pressure 127/70, pulse 83, temperature (!) 97.3 °F (36.3 °C), temperature source Temporal, last menstrual period 2023, unknown if currently breastfeeding.   SVE: fingertip, thick, high  FHT:135, moderate variability, one accel at 1948 present, no decels  Tiro: irritability, 1 ctxn in 25 minutes    Assessment and Plan:  29 y.o.  at 36w1d with pelvic pressure  Not in labor  Continue to monitor until additional accel(s) noted.   Anticipate discharge later this evening.    Darian Hitchcock MD  OB/GYN  2024 8:15 PM     8:44 PM accels present, pt feels well, she is in good condition for discharge home. Dr. Becerra aware.     Darian Hitchcock MD  OB/GYN  2024 8:45 PM

## 2024-02-23 ENCOUNTER — ROUTINE PRENATAL (OUTPATIENT)
Dept: OBGYN CLINIC | Facility: MEDICAL CENTER | Age: 29
End: 2024-02-23

## 2024-02-23 VITALS — DIASTOLIC BLOOD PRESSURE: 68 MMHG | SYSTOLIC BLOOD PRESSURE: 108 MMHG | BODY MASS INDEX: 27.12 KG/M2 | WEIGHT: 158 LBS

## 2024-02-23 DIAGNOSIS — Z3A.37 37 WEEKS GESTATION OF PREGNANCY: ICD-10-CM

## 2024-02-23 DIAGNOSIS — O26.843 UTERINE SIZE-DATE DISCREPANCY IN THIRD TRIMESTER: Primary | ICD-10-CM

## 2024-02-23 PROCEDURE — PNV: Performed by: STUDENT IN AN ORGANIZED HEALTH CARE EDUCATION/TRAINING PROGRAM

## 2024-02-23 NOTE — ASSESSMENT & PLAN NOTE
- Lagging for gestational age  - Patient desires to await spontaneous labor, recommend 3rd trimester growth US

## 2024-02-23 NOTE — PROGRESS NOTES
Routine Prenatal Visit  [facility] [address], [place], [place]    Assessment/Plan:  Taylor is a 29 y.o. year old  at 37w0d who presents for routine prenatal visit.     1. Uterine size-date discrepancy in third trimester  -     Ambulatory Referral to Maternal Fetal Medicine; Future; Expected date: 2024    2. 37 weeks gestation of pregnancy  Assessment & Plan:  - Lagging for gestational age  - Patient desires to await spontaneous labor, recommend 3rd trimester growth US            Subjective:     CC: Prenatal care    Taylor Cruz is a 29 y.o.  female who presents for routine prenatal care at 37w0d.  Pregnancy ROS: Denies leakage of fluid, pelvic pain, or vaginal bleeding.  Reports normal fetal movement.    The following portions of the patient's history were reviewed and updated as appropriate: allergies, current medications, past family history, past medical history, obstetric history, gynecologic history, past social history, past surgical history and problem list.      Objective:  /68   Wt 71.7 kg (158 lb)   LMP 2023   BMI 27.12 kg/m²   Pregravid Weight/BMI: 64 kg (141 lb) (BMI 24.19)  Current Weight: 71.7 kg (158 lb)   Total Weight Gain: 7.711 kg (17 lb)   Pre-Anna Vitals      Flowsheet Row Most Recent Value   Prenatal Assessment    Fetal Heart Rate 130   Fundal Height (cm) 34 cm   Movement Present   Prenatal Vitals    Blood Pressure 108/68   Weight - Scale 71.7 kg (158 lb)   Urine Albumin/Glucose    Dilation/Effacement/Station    Vaginal Drainage    Draining Fluid No   Edema    LLE Edema None   RLE Edema None   Facial Edema None             General: Well appearing, no distress  Respiratory: Unlabored breathing  Cardiovascular: Regular rate.  Abdomen: Soft, gravid, nontender  Fundal Height: Lagging for gestational age.  Extremities: Warm and well perfused.  Non tender.      Phoebe Becerra MD  [facility]  [facility]  [facility]  2/23/2024 7:31 AM

## 2024-02-27 PROBLEM — O26.843 FUNDAL HEIGHT LOW FOR DATES IN THIRD TRIMESTER: Status: ACTIVE | Noted: 2024-02-27

## 2024-02-28 ENCOUNTER — ULTRASOUND (OUTPATIENT)
Facility: HOSPITAL | Age: 29
End: 2024-02-28
Attending: STUDENT IN AN ORGANIZED HEALTH CARE EDUCATION/TRAINING PROGRAM
Payer: COMMERCIAL

## 2024-02-28 VITALS
WEIGHT: 156.4 LBS | HEIGHT: 64 IN | HEART RATE: 99 BPM | DIASTOLIC BLOOD PRESSURE: 64 MMHG | BODY MASS INDEX: 26.7 KG/M2 | SYSTOLIC BLOOD PRESSURE: 112 MMHG

## 2024-02-28 DIAGNOSIS — O26.843 UTERINE SIZE-DATE DISCREPANCY IN THIRD TRIMESTER: ICD-10-CM

## 2024-02-28 DIAGNOSIS — O26.843 FUNDAL HEIGHT LOW FOR DATES IN THIRD TRIMESTER: ICD-10-CM

## 2024-02-28 DIAGNOSIS — Z36.89 ENCOUNTER FOR ULTRASOUND TO CHECK FETAL GROWTH: Primary | ICD-10-CM

## 2024-02-28 PROCEDURE — 76816 OB US FOLLOW-UP PER FETUS: CPT | Performed by: OBSTETRICS & GYNECOLOGY

## 2024-02-28 NOTE — LETTER
"   Date: 2024    Phoebe Becerra MD  [address] [place] [address]    Patient: Taylor Cruz   YOB: 1995   Date of Visit: 2024   Gestational age 37w6d   Nature of this communication: Routine though please note FGR ruled out.       Dear Phoebe,    This patient was seen recently in our  office.  Please see ultrasound report under \"OB Procedures\" tab.  Please don't hesitate to contact our office with any concerns or questions.      Sincerely,      Melissa Berger MD  Attending Physician, Maternal-Fetal Medicine  [facility]      "

## 2024-02-29 ENCOUNTER — ROUTINE PRENATAL (OUTPATIENT)
Dept: OBGYN CLINIC | Facility: MEDICAL CENTER | Age: 29
End: 2024-02-29

## 2024-02-29 VITALS — SYSTOLIC BLOOD PRESSURE: 112 MMHG | WEIGHT: 159.3 LBS | DIASTOLIC BLOOD PRESSURE: 70 MMHG | BODY MASS INDEX: 27.34 KG/M2

## 2024-02-29 DIAGNOSIS — Z3A.37 37 WEEKS GESTATION OF PREGNANCY: ICD-10-CM

## 2024-02-29 DIAGNOSIS — Z34.93 PRENATAL CARE IN THIRD TRIMESTER: Primary | ICD-10-CM

## 2024-02-29 PROCEDURE — PNV: Performed by: NURSE PRACTITIONER

## 2024-02-29 NOTE — PROGRESS NOTES
"[facility]: Ms. Cruz was seen today for fetal growth assessment ultrasound.  See ultrasound report under \"OB Procedures\" tab.   Results were provided by phone.  Please don't hesitate to contact our office with any concerns or questions.  -Melissa Berger MD    "

## 2024-02-29 NOTE — PROGRESS NOTES
Denies loss of fluid, vaginal bleeding and abdominal pain.  Confirms frequent fetal movement.  Doing fetal kick counts.  Tolerating prenatal vitamin well.  Had ultrasound yesterday with MFM report not available at time of today's visit states baby is vertex and ultrasound was normal.  Is interested in elective induction.  Aware we cannot schedule until tomorrow.  Denies other questions or concerns at today's visit  BP: 112/70 weight: +18lbs SVE - FT/ 50/-3  Plan  -Continue prenatal vitamins daily  -Continue fetal kick counts daily  -Continue vaginal/perineal massage 1-4 times per week  -Induction of labor and desires elective.  Information sent to surgical scheduler for scheduling.  Patient aware she will hear time and date from office in the next few days.  -Common discomforts of pregnancy and precautions reviewed.  Signs and symptoms of labor reviewed.  RTO 1 week f/u eIOL date

## 2024-03-01 ENCOUNTER — TELEPHONE (OUTPATIENT)
Dept: OBGYN CLINIC | Facility: MEDICAL CENTER | Age: 29
End: 2024-03-01

## 2024-03-01 NOTE — TELEPHONE ENCOUNTER
Patient scheduled for IOL 3/11 @ 9PM. Patient made aware.     ----- Message from JOSÉ LUIS Martínez sent at 2/29/2024  7:15 AM EST -----  Hi,     Please schedule IOL- elective.     EDC 3/15/24  39w 3/8/24    Night into morning for cervical ripening- galarza balloon / cytotec     Thank you

## 2024-03-06 PROBLEM — Z3A.39 39 WEEKS GESTATION OF PREGNANCY: Status: ACTIVE | Noted: 2023-11-03

## 2024-03-06 NOTE — PROGRESS NOTES
Problem List Items Addressed This Visit          Other    39 weeks gestation of pregnancy - Primary     Mfm on 10/27/23- reviewed   No genetic testing     GBS negative.            Fundal height low for dates in third trimester     The fetal growth was 72%  The AC was 93%  Went over shoulder   She did have a baby over 8 lbs           Ve is 2-3/70/-3  She had a baby 8.4 already   Pelvis feels very adequate for such

## 2024-03-08 ENCOUNTER — ROUTINE PRENATAL (OUTPATIENT)
Dept: OBGYN CLINIC | Facility: MEDICAL CENTER | Age: 29
End: 2024-03-08

## 2024-03-08 VITALS — SYSTOLIC BLOOD PRESSURE: 100 MMHG | DIASTOLIC BLOOD PRESSURE: 60 MMHG | WEIGHT: 157.4 LBS | BODY MASS INDEX: 27.02 KG/M2

## 2024-03-08 DIAGNOSIS — Z3A.39 39 WEEKS GESTATION OF PREGNANCY: Primary | ICD-10-CM

## 2024-03-08 DIAGNOSIS — O26.843 FUNDAL HEIGHT LOW FOR DATES IN THIRD TRIMESTER: ICD-10-CM

## 2024-03-08 PROCEDURE — PNV: Performed by: OBSTETRICS & GYNECOLOGY

## 2024-03-11 ENCOUNTER — HOSPITAL ENCOUNTER (OUTPATIENT)
Dept: LABOR AND DELIVERY | Facility: HOSPITAL | Age: 29
Discharge: HOME | End: 2024-03-11
Payer: COMMERCIAL

## 2024-03-11 ENCOUNTER — HOSPITAL ENCOUNTER (INPATIENT)
Facility: HOSPITAL | Age: 29
LOS: 2 days | Discharge: HOME | End: 2024-03-13
Attending: OBSTETRICS & GYNECOLOGY | Admitting: OBSTETRICS & GYNECOLOGY
Payer: COMMERCIAL

## 2024-03-11 DIAGNOSIS — Z3A.39 39 WEEKS GESTATION OF PREGNANCY: ICD-10-CM

## 2024-03-11 PROBLEM — Z34.90 ENCOUNTER FOR ELECTIVE INDUCTION OF LABOR: Status: ACTIVE | Noted: 2024-03-11

## 2024-03-11 LAB
ABO GROUP BLD: NORMAL
BLD GP AB SCN SERPL QL: NEGATIVE
ERYTHROCYTE [DISTWIDTH] IN BLOOD BY AUTOMATED COUNT: 13.4 % (ref 11.6–15.1)
HCT VFR BLD AUTO: 32.4 % (ref 34.8–46.1)
HGB BLD-MCNC: 10.4 G/DL (ref 11.5–15.4)
HOLD SPECIMEN: YES
MCH RBC QN AUTO: 24.9 PG (ref 26.8–34.3)
MCHC RBC AUTO-ENTMCNC: 32.1 G/DL (ref 31.4–37.4)
MCV RBC AUTO: 78 FL (ref 82–98)
PLATELET # BLD AUTO: 241 THOUSANDS/UL (ref 149–390)
PMV BLD AUTO: 10.9 FL (ref 8.9–12.7)
RBC # BLD AUTO: 4.17 MILLION/UL (ref 3.81–5.12)
RH BLD: POSITIVE
SPECIMEN EXPIRATION DATE: NORMAL
WBC # BLD AUTO: 10.17 THOUSAND/UL (ref 4.31–10.16)

## 2024-03-11 PROCEDURE — 86780 TREPONEMA PALLIDUM: CPT

## 2024-03-11 PROCEDURE — 86850 RBC ANTIBODY SCREEN: CPT

## 2024-03-11 PROCEDURE — 85027 COMPLETE CBC AUTOMATED: CPT

## 2024-03-11 PROCEDURE — 86900 BLOOD TYPING SEROLOGIC ABO: CPT

## 2024-03-11 PROCEDURE — 86901 BLOOD TYPING SEROLOGIC RH(D): CPT

## 2024-03-11 PROCEDURE — NC001 PR NO CHARGE: Performed by: OBSTETRICS & GYNECOLOGY

## 2024-03-11 RX ORDER — ONDANSETRON 2 MG/ML
4 INJECTION INTRAMUSCULAR; INTRAVENOUS EVERY 4 HOURS PRN
Status: DISCONTINUED | OUTPATIENT
Start: 2024-03-11 | End: 2024-03-12

## 2024-03-11 RX ORDER — BUPIVACAINE HYDROCHLORIDE 2.5 MG/ML
30 INJECTION, SOLUTION EPIDURAL; INFILTRATION; INTRACAUDAL ONCE AS NEEDED
Status: DISCONTINUED | OUTPATIENT
Start: 2024-03-11 | End: 2024-03-12

## 2024-03-11 RX ORDER — SODIUM CHLORIDE, SODIUM LACTATE, POTASSIUM CHLORIDE, CALCIUM CHLORIDE 600; 310; 30; 20 MG/100ML; MG/100ML; MG/100ML; MG/100ML
125 INJECTION, SOLUTION INTRAVENOUS CONTINUOUS
Status: DISCONTINUED | OUTPATIENT
Start: 2024-03-11 | End: 2024-03-12

## 2024-03-11 RX ORDER — OXYTOCIN/RINGER'S LACTATE 30/500 ML
1-30 PLASTIC BAG, INJECTION (ML) INTRAVENOUS
Status: DISCONTINUED | OUTPATIENT
Start: 2024-03-11 | End: 2024-03-12

## 2024-03-11 RX ADMIN — SODIUM CHLORIDE, SODIUM LACTATE, POTASSIUM CHLORIDE, AND CALCIUM CHLORIDE 999 ML/HR: .6; .31; .03; .02 INJECTION, SOLUTION INTRAVENOUS at 22:20

## 2024-03-11 RX ADMIN — SODIUM CHLORIDE, SODIUM LACTATE, POTASSIUM CHLORIDE, AND CALCIUM CHLORIDE 125 ML/HR: .6; .31; .03; .02 INJECTION, SOLUTION INTRAVENOUS at 23:43

## 2024-03-12 ENCOUNTER — ANESTHESIA (INPATIENT)
Dept: ANESTHESIOLOGY | Facility: HOSPITAL | Age: 29
End: 2024-03-12
Payer: COMMERCIAL

## 2024-03-12 ENCOUNTER — ANESTHESIA EVENT (INPATIENT)
Dept: ANESTHESIOLOGY | Facility: HOSPITAL | Age: 29
End: 2024-03-12
Payer: COMMERCIAL

## 2024-03-12 LAB
BASE EXCESS BLDCOA CALC-SCNC: -3 MMOL/L (ref 3–11)
BASE EXCESS BLDCOV CALC-SCNC: -1.9 MMOL/L (ref 1–9)
HCO3 BLDCOA-SCNC: 23.5 MMOL/L (ref 17.3–27.3)
HCO3 BLDCOV-SCNC: 22.6 MMOL/L (ref 12.2–28.6)
O2 CT VFR BLDCOA CALC: 7.9 ML/DL
OXYHGB MFR BLDCOA: 38.5 %
OXYHGB MFR BLDCOV: 75.9 %
PCO2 BLDCOA: 47.5 MM[HG] (ref 30–60)
PCO2 BLDCOV: 37.6 MM HG (ref 27–43)
PH BLDCOA: 7.31 [PH] (ref 7.23–7.43)
PH BLDCOV: 7.4 [PH] (ref 7.19–7.49)
PO2 BLDCOA: 17.6 MM HG (ref 5–25)
PO2 BLDCOV: 33.4 MM HG (ref 15–45)
SAO2 % BLDCOV: 15.3 ML/DL

## 2024-03-12 PROCEDURE — 10907ZC DRAINAGE OF AMNIOTIC FLUID, THERAPEUTIC FROM PRODUCTS OF CONCEPTION, VIA NATURAL OR ARTIFICIAL OPENING: ICD-10-PCS | Performed by: OBSTETRICS & GYNECOLOGY

## 2024-03-12 PROCEDURE — 59400 OBSTETRICAL CARE: CPT | Performed by: OBSTETRICS & GYNECOLOGY

## 2024-03-12 PROCEDURE — 82805 BLOOD GASES W/O2 SATURATION: CPT | Performed by: OBSTETRICS & GYNECOLOGY

## 2024-03-12 RX ORDER — ACETAMINOPHEN 325 MG/1
650 TABLET ORAL EVERY 4 HOURS PRN
Status: DISCONTINUED | OUTPATIENT
Start: 2024-03-12 | End: 2024-03-13 | Stop reason: HOSPADM

## 2024-03-12 RX ORDER — ROPIVACAINE HYDROCHLORIDE 2 MG/ML
INJECTION, SOLUTION EPIDURAL; INFILTRATION; PERINEURAL AS NEEDED
Status: DISCONTINUED | OUTPATIENT
Start: 2024-03-12 | End: 2024-03-12 | Stop reason: HOSPADM

## 2024-03-12 RX ORDER — LIDOCAINE HYDROCHLORIDE AND EPINEPHRINE 15; 5 MG/ML; UG/ML
INJECTION, SOLUTION EPIDURAL AS NEEDED
Status: DISCONTINUED | OUTPATIENT
Start: 2024-03-12 | End: 2024-03-12 | Stop reason: HOSPADM

## 2024-03-12 RX ORDER — PHENYLEPHRINE HCL IN 0.9% NACL 1 MG/10 ML
SYRINGE (ML) INTRAVENOUS AS NEEDED
Status: DISCONTINUED | OUTPATIENT
Start: 2024-03-12 | End: 2024-03-12 | Stop reason: HOSPADM

## 2024-03-12 RX ORDER — BENZOCAINE/MENTHOL 6 MG-10 MG
1 LOZENGE MUCOUS MEMBRANE DAILY PRN
Status: DISCONTINUED | OUTPATIENT
Start: 2024-03-12 | End: 2024-03-13 | Stop reason: HOSPADM

## 2024-03-12 RX ORDER — CALCIUM CARBONATE 500 MG/1
1000 TABLET, CHEWABLE ORAL DAILY PRN
Status: DISCONTINUED | OUTPATIENT
Start: 2024-03-12 | End: 2024-03-13 | Stop reason: HOSPADM

## 2024-03-12 RX ORDER — ONDANSETRON 2 MG/ML
4 INJECTION INTRAMUSCULAR; INTRAVENOUS EVERY 8 HOURS PRN
Status: DISCONTINUED | OUTPATIENT
Start: 2024-03-12 | End: 2024-03-13 | Stop reason: HOSPADM

## 2024-03-12 RX ORDER — OXYTOCIN/RINGER'S LACTATE 30/500 ML
250 PLASTIC BAG, INJECTION (ML) INTRAVENOUS ONCE
Status: COMPLETED | OUTPATIENT
Start: 2024-03-12 | End: 2024-03-12

## 2024-03-12 RX ORDER — DOCUSATE SODIUM 100 MG/1
100 CAPSULE, LIQUID FILLED ORAL 2 TIMES DAILY
Status: DISCONTINUED | OUTPATIENT
Start: 2024-03-12 | End: 2024-03-13 | Stop reason: HOSPADM

## 2024-03-12 RX ORDER — IBUPROFEN 600 MG/1
600 TABLET ORAL EVERY 6 HOURS
Status: DISCONTINUED | OUTPATIENT
Start: 2024-03-12 | End: 2024-03-13 | Stop reason: HOSPADM

## 2024-03-12 RX ADMIN — ROPIVACAINE HYDROCHLORIDE 4 ML: 2 INJECTION EPIDURAL; INFILTRATION; PERINEURAL at 03:27

## 2024-03-12 RX ADMIN — Medication 50 MCG: at 06:25

## 2024-03-12 RX ADMIN — SODIUM CHLORIDE, SODIUM LACTATE, POTASSIUM CHLORIDE, AND CALCIUM CHLORIDE 125 ML/HR: .6; .31; .03; .02 INJECTION, SOLUTION INTRAVENOUS at 04:56

## 2024-03-12 RX ADMIN — ROPIVACAINE HYDROCHLORIDE 5 ML: 2 INJECTION EPIDURAL; INFILTRATION; PERINEURAL at 03:25

## 2024-03-12 RX ADMIN — ACETAMINOPHEN 325MG 650 MG: 325 TABLET ORAL at 21:47

## 2024-03-12 RX ADMIN — ONDANSETRON 4 MG: 2 INJECTION INTRAMUSCULAR; INTRAVENOUS at 11:41

## 2024-03-12 RX ADMIN — LIDOCAINE HYDROCHLORIDE AND EPINEPHRINE 3 ML: 15; 5 INJECTION, SOLUTION EPIDURAL at 03:22

## 2024-03-12 RX ADMIN — ROPIVACAINE HYDROCHLORIDE: 2 INJECTION, SOLUTION EPIDURAL; INFILTRATION at 07:43

## 2024-03-12 RX ADMIN — SODIUM CHLORIDE, SODIUM LACTATE, POTASSIUM CHLORIDE, AND CALCIUM CHLORIDE 999 ML/HR: .6; .31; .03; .02 INJECTION, SOLUTION INTRAVENOUS at 03:56

## 2024-03-12 RX ADMIN — SODIUM CHLORIDE, SODIUM LACTATE, POTASSIUM CHLORIDE, AND CALCIUM CHLORIDE 999 ML/HR: .6; .31; .03; .02 INJECTION, SOLUTION INTRAVENOUS at 02:59

## 2024-03-12 RX ADMIN — DOCUSATE SODIUM 100 MG: 100 CAPSULE, LIQUID FILLED ORAL at 17:20

## 2024-03-12 RX ADMIN — ONDANSETRON 4 MG: 2 INJECTION INTRAMUSCULAR; INTRAVENOUS at 04:43

## 2024-03-12 RX ADMIN — IBUPROFEN 600 MG: 600 TABLET ORAL at 15:20

## 2024-03-12 RX ADMIN — ROPIVACAINE HYDROCHLORIDE: 2 INJECTION, SOLUTION EPIDURAL; INFILTRATION at 11:39

## 2024-03-12 RX ADMIN — ROPIVACAINE HYDROCHLORIDE 10 ML/HR: 2 INJECTION EPIDURAL; INFILTRATION; PERINEURAL at 03:31

## 2024-03-12 RX ADMIN — SODIUM CHLORIDE, SODIUM LACTATE, POTASSIUM CHLORIDE, AND CALCIUM CHLORIDE 999 ML/HR: .6; .31; .03; .02 INJECTION, SOLUTION INTRAVENOUS at 10:55

## 2024-03-12 RX ADMIN — Medication 2 MILLI-UNITS/MIN: at 02:22

## 2024-03-12 RX ADMIN — Medication 250 MILLI-UNITS/MIN: at 13:10

## 2024-03-12 NOTE — ANESTHESIA POSTPROCEDURE EVALUATION
Post-Op Assessment Note    CV Status:  Stable    Pain management: adequate      Post-op block assessment: catheter intact and no complications   Mental Status:  Awake   Hydration Status:  Stable   PONV Controlled:  Controlled   Airway Patency:  Patent     Post Op Vitals Reviewed: Yes    No anethesia notable event occurred.    Staff: Anesthesiologist               /82 (03/12/24 1411)    Temp      Pulse 85 (03/12/24 1411)   Resp      SpO2

## 2024-03-12 NOTE — PLAN OF CARE
Problem: ANTEPARTUM  Goal: Maintain pregnancy as long as maternal and/or fetal condition is stable  Description: INTERVENTIONS:  - Maternal surveillance  - Fetal surveillance  - Monitor uterine activity  - Medications as ordered  - Bedrest  Outcome: Progressing     Problem: BIRTH - VAGINAL/ SECTION  Goal: Fetal and maternal status remain reassuring during the birth process  Description: INTERVENTIONS:  - Monitor vital signs  - Monitor fetal heart rate  - Monitor uterine activity  - Monitor labor progression (vaginal delivery)  - DVT prophylaxis  - Antibiotic prophylaxis  Outcome: Progressing  Goal: Emotionally satisfying birthing experience for mother/fetus  Description: Interventions:  - Assess, plan, implement and evaluate the nursing care given to the patient in labor  - Advocate the philosophy that each childbirth experience is a unique experience and support the family's chosen level of involvement and control during the labor process   - Actively participate in both the patient's and family's teaching of the birth process  - Consider cultural, Pentecostalism and age-specific factors and plan care for the patient in labor  Outcome: Progressing     Problem: POSTPARTUM  Goal: Experiences normal postpartum course  Description: INTERVENTIONS:  - Monitor maternal vital signs  - Assess uterine involution and lochia  Outcome: Progressing  Goal: Appropriate maternal -  bonding  Description: INTERVENTIONS:  - Identify family support  - Assess for appropriate maternal/infant bonding   -Encourage maternal/infant bonding opportunities  - Referral to  or  as needed  Outcome: Progressing  Goal: Establishment of infant feeding pattern  Description: INTERVENTIONS:  - Assess breast/bottle feeding  - Refer to lactation as needed  Outcome: Progressing  Goal: Incision(s), wounds(s) or drain site(s) healing without S/S of infection  Description: INTERVENTIONS  - Assess and document dressing,  incision, wound bed, drain sites and surrounding tissue  - Provide patient and family education  - Perform skin care/dressing changes every   Outcome: Progressing

## 2024-03-12 NOTE — OB LABOR/OXYTOCIN SAFETY PROGRESS
Oxytocin Safety Progress Check Note - Taylor Mimi Cruz 29 y.o. female MRN: [id number]    Unit/Bed#: -01 Encounter: [id number]    Dose (marcela-units/min) Oxytocin: 2 marcela-units/min  Contraction Frequency (minutes): 1.5-2  Contraction Intensity: Mild/Moderate  Uterine Activity Characteristics: Regular  Cervical Dilation: 4        Cervical Effacement: 70  Fetal Station: -3  Baseline Rate (FHR): 135 bpm     FHR Category: II               Vital Signs:   Vitals:    03/12/24 0355   BP: 107/71   Pulse: (!) 114   Resp:    Temp:        Notes/comments:   Patient received epidural. Tocometer demonstrating contractions every 1-2 min with 2mu pitocin. FHT demonstrating minimal variability. Pause pitocin and continue fluid bolus.     Will perform SVE and place galarza catheter in approx 20 min.        Rebecca Covarrubias MD 3/12/2024 3:58 AM

## 2024-03-12 NOTE — OB LABOR/OXYTOCIN SAFETY PROGRESS
Oxytocin Safety Progress Check Note - Taylor Cruz 29 y.o. female MRN: [id number]    Unit/Bed#: -01 Encounter: [id number]    Dose (marcela-units/min) Oxytocin: 2 marcela-units/min (Restarted per Dr. Conde)  Contraction Frequency (minutes): 4-6  Contraction Intensity: Mild/Moderate  Uterine Activity Characteristics: Regular  Cervical Dilation: 4        Cervical Effacement: 80  Fetal Station: -2  Baseline Rate (FHR): 150 bpm     FHR Category: 1               Vital Signs:   Vitals:    03/12/24 0743   BP:    Pulse:    Resp:    Temp: 99.1 °F (37.3 °C)       Notes/comments:   FHT category 1 with contractions every 4-6 min.  SVE unchanged.  Fetal head well applied, and AROM performed for clear fluid.  Pit held since around 4 am for tachysystole; will re-start now.  Dr. Sterling aware.        Nadia Conde MD 3/12/2024 7:53 AM

## 2024-03-12 NOTE — OB LABOR/OXYTOCIN SAFETY PROGRESS
Oxytocin Safety Progress Check Note - Taylor Mimi Cruz 29 y.o. female MRN: [id number]    Unit/Bed#: -01 Encounter: [id number]    Dose (marcela-units/min) Oxytocin: 4 marcela-units/min  Contraction Frequency (minutes): 1.5-2  Contraction Intensity: Moderate  Uterine Activity Characteristics: Regular  Cervical Dilation: 8-9        Cervical Effacement: 90  Fetal Station: 0  Baseline Rate (FHR): 130 bpm     FHR Category: 1  Oxytocin Safety Progress Check: Safety check completed            Vital Signs:   Vitals:    03/12/24 1141   BP: 123/82   Pulse: 75   Resp:    Temp:        Notes/comments:   Pt progressing well, feeling pressure but no acute pain. Continue current management, continue to monitor, anticipate complete dilation and pushing soon.       Darian Hitchcock MD 3/12/2024 11:51 AM

## 2024-03-12 NOTE — OB LABOR/OXYTOCIN SAFETY PROGRESS
Oxytocin Safety Progress Check Note - Taylor Mimitemo Cruz 29 y.o. female MRN: [id number]    Unit/Bed#: -01 Encounter: [id number]    Dose (marcela-units/min) Oxytocin: 0 marcela-units/min  Contraction Frequency (minutes): 2-3  Contraction Intensity: Mild/Moderate  Uterine Activity Characteristics: Regular  Cervical Dilation: 4        Cervical Effacement: 80  Fetal Station: -2  Baseline Rate (FHR): 130 bpm     FHR Category: I               Vital Signs:   Vitals:    03/12/24 0512   BP: 107/67   Pulse: 72   Resp:    Temp:        Notes/comments:   Patient reported feeling a stabbing pain epigastrically with vomiting. It did not radiate to shoulders or back and has since resolved. Pain denies shortness of breath, chest pain, nausea.       FHT cat I. Continue to monitor.     Rebecca Covarrubias MD 3/12/2024 5:21 AM

## 2024-03-12 NOTE — PLAN OF CARE
Problem: BIRTH - VAGINAL/ SECTION  Goal: Fetal and maternal status remain reassuring during the birth process  Description: INTERVENTIONS:  - Monitor vital signs  - Monitor fetal heart rate  - Monitor uterine activity  - Monitor labor progression (vaginal delivery)  - DVT prophylaxis  - Antibiotic prophylaxis  Outcome: Progressing  Goal: Emotionally satisfying birthing experience for mother/fetus  Description: Interventions:  - Assess, plan, implement and evaluate the nursing care given to the patient in labor  - Advocate the philosophy that each childbirth experience is a unique experience and support the family's chosen level of involvement and control during the labor process   - Actively participate in both the patient's and family's teaching of the birth process  - Consider cultural, Uatsdin and age-specific factors and plan care for the patient in labor  Outcome: Progressing     Problem: Knowledge Deficit  Goal: Verbalizes understanding of labor plan  Description: Assess patient/family/caregiver's baseline knowledge level and ability to understand information.  Provide education via patient/family/caregiver's preferred learning method at appropriate level of understanding.     1. Provide teaching at level of understanding.  2. Provide teaching via preferred learning method(s).  Outcome: Progressing     Problem: Labor & Delivery  Goal: Manages discomfort  Description: Assess and monitor for signs and symptoms of discomfort.  Assess patient's pain level regularly and per hospital policy.  Administer medications as ordered. Support use of nonpharmacological methods to help control pain such as distraction, imagery, relaxation, and application of heat and cold.  Collaborate with interdisciplinary team and patient to determine appropriate pain management plan.    1. Include patient in decisions related to comfort.  2. Offer non-pharmacological pain management interventions.  3. Report ineffective pain  management to physician.  Outcome: Progressing  Goal: Patient vital signs are stable  Description: 1. Assess vital signs - vaginal delivery.  Outcome: Progressing

## 2024-03-12 NOTE — ANESTHESIA PROCEDURE NOTES
Epidural Block    Patient location during procedure: OB/L&D  Start time: 3/12/2024 3:22 AM  Reason for block: primary anesthetic  Staffing  Performed by: Herminio Melchor DO  Authorized by: Herminio Melchor DO    Preanesthetic Checklist  Completed: patient identified, IV checked, site marked, risks and benefits discussed, surgical consent, monitors and equipment checked, pre-op evaluation and timeout performed  Epidural  Patient position: sitting  Prep: Betadine  Sedation Level: no sedation  Patient monitoring: heart rate, frequent blood pressure checks and continuous pulse oximetry  Approach: midline  Location: lumbar, L3-4  Injection technique: JOHANNA saline  Needle  Needle type: Tuohy   Needle gauge: 18 G  Needle insertion depth: 6 cm  Catheter type: multi-orifice  Catheter size: 20 G  Catheter at skin depth: 10 cm  Catheter securement method: tape  Test dose: negative  Assessment  Number of attempts: 2negative aspiration for CSF, negative aspiration for heme and no paresthesia on injection  patient tolerated the procedure well with no immediate complications

## 2024-03-12 NOTE — OB LABOR/OXYTOCIN SAFETY PROGRESS
Oxytocin Safety Progress Check Note - Taylor Mimi Cruz 29 y.o. female MRN: [id number]    Unit/Bed#: -01 Encounter: [id number]    Dose (marcela-units/min) Oxytocin: 4 marcela-units/min  Contraction Frequency (minutes): 1.5-2  Contraction Intensity: Moderate  Uterine Activity Characteristics: Regular  Cervical Dilation: 10  Dilation Complete Date: 03/12/24  Dilation Complete Time: 1258  Cervical Effacement: 100  Fetal Station: 2  Baseline Rate (FHR): 130 bpm     FHR Category: I  Oxytocin Safety Progress Check: Safety check completed            Vital Signs:   Vitals:    03/12/24 1245   BP:    Pulse:    Resp:    Temp: 98.6 °F (37 °C)       Notes/comments:   Patient complete and plus 2. FHT cat I. Plan to start pushing.      Kiesha Zavala MD 3/12/2024 12:58 PM

## 2024-03-12 NOTE — H&P
"H&P - Obstetrics   Taylor Cruz 29 y.o. female MRN: [id number]  Unit/Bed#: -01 Encounter: [id number]    Assessment and Plan:  29 y.o.  at 39w3d who is being admitted for elective induction of labor. By issue:  * 39 weeks gestation of pregnancy  Assessment & Plan  - GBS prophylaxis is not needed  - EFW 72%, AC 93%  - vertex by TAUS      Encounter for elective induction of labor  Assessment & Plan  Admit   T&S, CBC, RPR  CLD  IV fluids  Induction with Rosa balloon and Pitocin titration        Plan of care discussed with Dr. Menjivar.    This patient will be an INPATIENT and I certify the anticipated length of stay is >2 Midnights.    History of Present Illness     Chief Complaint: \"here for my induction\"    History of Present Illness:  Taylor Cruz is a 29 y.o.  with an TATE of 3/15/2024, by Ultrasound at 39w3d weeks gestation who presents for elective induction of labor. She denies contractions, loss of fluid, vaginal bleeding, and endorses good fetal movement.    ROS otherwise negative.     Obstetrician: [facility]    OB History    Para Term  AB Living   3 1 1   1 1   SAB IAB Ectopic Multiple Live Births   1     0 1      # Outcome Date GA Lbr Eron/2nd Weight Sex Delivery Anes PTL Lv   3 Current            2 SAB 2023           1 Term 20 40w3d / 00:23 3745 g (8 lb 4.1 oz) M Vag-Spont EPI N SHERWIN       Baby complications/comments: none    Review of Systems  12-point ROS negative unless stated in HPI.    Historical Information   Past Medical History:   Diagnosis Date    Migraine     Urinary tract infection     frequent UTI in past     Past Surgical History:   Procedure Laterality Date    BREAST SURGERY      breast augemtation    TOE SURGERY       Social History   Social History     Substance and Sexual Activity   Alcohol Use Not Currently    Comment: socially     Social History     Substance and Sexual Activity   Drug Use Never     Social History     Tobacco " "Use   Smoking Status Never   Smokeless Tobacco Never     Family History:   Family History   Problem Relation Age of Onset    Migraines Mother     Coronary artery disease Father     No Known Problems Brother     No Known Problems Son     No Known Problems Maternal Grandmother     Diabetes Maternal Grandfather     Coronary artery disease Paternal Grandmother     Heart disease Paternal Grandfather     Breast cancer Neg Hx     Colon cancer Neg Hx     Ovarian cancer Neg Hx        Meds/Allergies      Medications Prior to Admission   Medication    Prenatal Vit-Fe Fumarate-FA (Prenatal 19) tablet        Allergies   Allergen Reactions    Penicillins Hives     Tolerates cephalosporins       Objective:  Vitals: Last menstrual period 06/03/2023, unknown if currently breastfeeding.There is no height or weight on file to calculate BMI.   /79      Physical Exam  GEN: alert and oriented x 3, no apparent distress, appears well  CARDIAC: RRR  PULMONARY: effort wnl  ABDOMEN: gravid, soft, no tenderness  GENITOURINARY: external genitalia grossly normal, SVE 1.5/70/-3   EXTREMITIES: nontender, no edema  FETAL ASSESSMENT:  Fetal heart rate: 150bpm/moderate variability/15x15 accelerations/no decelerations; Category I  [place]: irritability    Prenatal Labs: Blood Type:   Lab Results   Component Value Date/Time    ABO Grouping B 03/11/2024 09:34 PM     , D (Rh type):   Lab Results   Component Value Date/Time    Rh Factor Positive 03/11/2024 09:34 PM     , Antibody Screen: No results found for: \"ANTIBODYSCR\" , HCT/HGB:   Lab Results   Component Value Date/Time    Hematocrit 32.4 (L) 03/11/2024 09:34 PM    Hemoglobin 10.4 (L) 03/11/2024 09:34 PM      , MCV:   Lab Results   Component Value Date/Time    MCV 78 (L) 03/11/2024 09:34 PM      , Platelets:   Lab Results   Component Value Date/Time    Platelets 241 03/11/2024 09:34 PM      , 1 hour Glucola:   Lab Results   Component Value Date/Time    Glucose 87 12/20/2023 11:54 AM   , " "Varicella:   Lab Results   Component Value Date/Time    Varicella IgG NON-IMMUNE (A) 08/03/2023 09:02 AM    Varicella IgG NON-IMMUNE (A) 02/09/2017 04:09 PM       , Rubella:   Lab Results   Component Value Date/Time    Rubella IgG Quant 53.9 08/03/2023 09:02 AM        , VDRL/RPR:   Lab Results   Component Value Date/Time    RPR Non-Reactive 08/06/2020 04:10 AM      , Urine Culture/Screen:   Lab Results   Component Value Date/Time    Urine Culture No Growth <1000 cfu/mL 08/03/2023 09:02 AM       , Hep B:   Lab Results   Component Value Date/Time    Hepatitis B Surface Ag Non-reactive 08/03/2023 09:02 AM     , Hep C: No components found for: \"HEPCSAG\", \"EXTHEPCSAG\"   , HIV:   Lab Results   Component Value Date/Time    HIV-1/HIV-2 Ab Non-Reactive 02/24/2020 09:04 AM     , Chlamydia: No results found for: \"EXTCHLAMYDIA\"  , Gonorrhea:   Lab Results   Component Value Date/Time    N gonorrhoeae, DNA Probe Negative 09/08/2023 01:18 PM     , Group B Strep:    Lab Results   Component Value Date/Time    Strep Grp B PCR Negative 02/16/2024 09:05 AM            Invasive Devices       Peripheral Intravenous Line  Duration             Peripheral IV 03/11/24 Dorsal (posterior);Left Hand <1 day              Epidural Line  Duration             Epidural Catheter 08/06/20 1313 days                  Rebecca Covarrubias MD  Obstetrics & Gynecology, PGY-2  3/11/2024, 10:22 PM          "

## 2024-03-12 NOTE — OB LABOR/OXYTOCIN SAFETY PROGRESS
Oxytocin Safety Progress Check Note - Taylor Cruz 29 y.o. female MRN: [id number]    Unit/Bed#: -01 Encounter: [id number]    Dose (marcela-units/min) Oxytocin: 4 marcela-units/min  Contraction Frequency (minutes): 2-4  Contraction Intensity: Mild/Moderate  Uterine Activity Characteristics: Regular  Cervical Dilation: 5-6        Cervical Effacement: 80  Fetal Station: -1  Baseline Rate (FHR): 135 bpm     FHR Category: Cat 2               Vital Signs:   Vitals:    03/12/24 0941   BP: 110/71   Pulse: 71   Resp:    Temp:        Notes/comments:   Intermittent cat 2 FHT with intermittent variable decelerations that resolved with maternal repositioning. SVE as above. Change from prior exam. Feeling more pressure and sharp discomfort across her lower abdomen. Plan to contact anesthesia if persistent. Contnue maternal repositioning. Pitocin currently at rate of 4 mu/min. Increase as tolerated.  Dr. Sterling aware.      Avelino Hagan DO 3/12/2024 10:35 AM

## 2024-03-12 NOTE — PLAN OF CARE
Problem: POSTPARTUM  Goal: Experiences normal postpartum course  Description: INTERVENTIONS:  - Monitor maternal vital signs  - Assess uterine involution and lochia  Outcome: Progressing  Goal: Appropriate maternal -  bonding  Description: INTERVENTIONS:  - Identify family support  - Assess for appropriate maternal/infant bonding   -Encourage maternal/infant bonding opportunities  - Referral to  or  as needed  Outcome: Progressing  Goal: Establishment of infant feeding pattern  Description: INTERVENTIONS:  - Assess breast/bottle feeding  - Refer to lactation as needed  Outcome: Progressing  Goal: Incision(s), wounds(s) or drain site(s) healing without S/S of infection  Description: INTERVENTIONS  - Assess and document dressing, incision, wound bed, drain sites and surrounding tissue  - Provide patient and family education  Outcome: Progressing     Problem: ANTEPARTUM  Goal: Maintain pregnancy as long as maternal and/or fetal condition is stable  Description: INTERVENTIONS:  - Maternal surveillance  - Fetal surveillance  - Monitor uterine activity  - Medications as ordered  - Bedrest  Outcome: Completed     Problem: BIRTH - VAGINAL/ SECTION  Goal: Fetal and maternal status remain reassuring during the birth process  Description: INTERVENTIONS:  - Monitor vital signs  - Monitor fetal heart rate  - Monitor uterine activity  - Monitor labor progression (vaginal delivery)  - DVT prophylaxis  - Antibiotic prophylaxis  3/12/2024 1512 by Stephanie Ballard RN  Outcome: Completed  3/12/2024 0716 by Stephanie Ballard RN  Outcome: Progressing  Goal: Emotionally satisfying birthing experience for mother/fetus  Description: Interventions:  - Assess, plan, implement and evaluate the nursing care given to the patient in labor  - Advocate the philosophy that each childbirth experience is a unique experience and support the family's chosen level of involvement and control during the labor process    - Actively participate in both the patient's and family's teaching of the birth process  - Consider cultural, Confucianism and age-specific factors and plan care for the patient in labor  3/12/2024 1512 by Stephanie Ballard RN  Outcome: Completed  3/12/2024 0716 by Stephanie Ballard RN  Outcome: Progressing     Problem: Knowledge Deficit  Goal: Verbalizes understanding of labor plan  Description: Assess patient/family/caregiver's baseline knowledge level and ability to understand information.  Provide education via patient/family/caregiver's preferred learning method at appropriate level of understanding.     1. Provide teaching at level of understanding.  2. Provide teaching via preferred learning method(s).  3/12/2024 1512 by Stephanie Ballard RN  Outcome: Completed  3/12/2024 0717 by Stephanie Ballard RN  Outcome: Progressing     Problem: Labor & Delivery  Goal: Manages discomfort  Description: Assess and monitor for signs and symptoms of discomfort.  Assess patient's pain level regularly and per hospital policy.  Administer medications as ordered. Support use of nonpharmacological methods to help control pain such as distraction, imagery, relaxation, and application of heat and cold.  Collaborate with interdisciplinary team and patient to determine appropriate pain management plan.    1. Include patient in decisions related to comfort.  2. Offer non-pharmacological pain management interventions.  3. Report ineffective pain management to physician.  3/12/2024 1512 by Stephanie Ballard RN  Outcome: Completed  3/12/2024 0717 by Stephanie Ballard RN  Outcome: Progressing  Goal: Patient vital signs are stable  Description: 1. Assess vital signs - vaginal delivery.  3/12/2024 1512 by Stephanie Ballard RN  Outcome: Completed  3/12/2024 0717 by Stephanie Ballard RN  Outcome: Progressing

## 2024-03-12 NOTE — DISCHARGE SUMMARY
Obstetrics Discharge Summary  Taylor Cruz 29 y.o. female MRN: [id number]  Unit/Bed#: -01 Encounter: [id number]    Admission Date: 3/11/2024     Discharge Date: 3/13/24    Admitting Attending: Otto  Delivery Attending: Hallie  Discharging Attending: Dr. SHAN Rai    Admitting Diagnoses:   1. Pregnancy at 39 weeks      Discharge Diagnoses:   1. Same as above  2. Delivery of term     Procedures: spontaneous vaginal delivery    Anesthesia: epidural    Hospital course: Taylor Cruz is now a 29 y.o.  who was initially admitted at 39w3d for eIOL. Initial cervical exam was 2/70/-3 and a FB was placed. FB expelled and pitocin was started for continued induction. She received an epidural. Artificial amniotomy performed for clear fluid. She progressed to complete cervical dilation and began pushing.    On 3/12/24 she delivered a viable female  39w4d via normal spontaneous vaginal delivery. She sustained no lacerations during delivery. 's birth weight was 8lbs 3.6oz; Apgars were 7 (1 min) and 8 (5 min).  was admitted to the  nursery. Patient tolerated the procedure well.     Her post-delivery course was uncomplicated. Her postpartum pain was well controlled with oral analgesics, IBU. Maternal blood type is B+ so RhoGAM was not indicated.    On day of discharge, she was ambulating and able to reasonably perform all ADLs. She was voiding and had appropriate bowel function. Pain was well controlled. She was discharged home on postpartum day #1 without complications. Patient was instructed to follow up with her OBGYN as an outpatient and was given appropriate warnings to call provider if she develops signs of infection or uncontrolled pain.    Complications: none apparent    Condition at discharge: good     Provisions for Follow-Up Care:  Please see after visit summary for information related to follow-up care and any pertinent home health orders.       Disposition: Home with precuations    Planned Readmission: No    Discharge Medications:   Please see AVS for a complete list of discharge medications.    Discharge instructions :   Please see AVS for complete discharge instructions.    JOSÉ LUIS Martínez

## 2024-03-12 NOTE — OB LABOR/OXYTOCIN SAFETY PROGRESS
Labor Progress Note - Taylor Cruz 29 y.o. female MRN: [id number]    Unit/Bed#: -01 Encounter: [id number]       Contraction Frequency (minutes): irregular  Contraction Intensity: Mild     Cervical Dilation: 4        Cervical Effacement: 70  Fetal Station: -3  Baseline Rate (FHR): 160 bpm     FHR Category: I               Vital Signs:   Vitals:    03/11/24 2258   BP:    Pulse:    Resp: 16   Temp: 98.6 °F (37 °C)       Notes/comments:   Patient reporting contractions q3min. Rosa balloon out, SVE as above. FHT reactive. Pitocin ordered to start.       Rebecca Covarrubias MD 3/12/2024 2:07 AM

## 2024-03-12 NOTE — OB LABOR/OXYTOCIN SAFETY PROGRESS
Labor Progress Note - Taylor Cruz 29 y.o. female MRN: [id number]    Unit/Bed#: -01 Encounter: [id number]       Contraction Frequency (minutes): irregular  Contraction Intensity: Mild     Cervical Dilation: 2        Cervical Effacement: 70  Fetal Station: -3  Baseline Rate (FHR): 155 bpm     FHR Category: I               Vital Signs:   Vitals:    03/11/24 2258   BP: 129/79   Pulse:    Resp: 16   Temp: 98.6 °F (37 °C)       Notes/comments:   Galarza Balloon Induction Procedure    Reason for induction: elective. Induction plan previously discussed with Dr. Menjivar. Procedural risks reviewed, consent obtained.    The patient was placed in dorsal lithotomy position. Using Sterile technique, a 24F galarza balloon was advanced beyond the internal cervix os and inflated with 60cc LR. The catheter was clamped with an umbilical cord clamp and instructions were given to the patient's nurse to weight the balloon with a 1-L bag of LR.     Plan to start pitocin in a few hours.     The procedure was uncomplicated and the patient tolerated the procedure well.    Rebecca Covarrubias MD 3/11/2024 11:20 PM

## 2024-03-12 NOTE — ANESTHESIA PREPROCEDURE EVALUATION
Procedure:  LABOR ANALGESIA    Relevant Problems   ANESTHESIA (within normal limits)      CARDIO (within normal limits)   (+) Migraine      /RENAL (within normal limits)      GYN   (+) 39 weeks gestation of pregnancy      NEURO/PSYCH   (+) Anxiety   (+) Migraine      PULMONARY (within normal limits)        Physical Exam    Airway    Mallampati score: II         Dental   No notable dental hx     Cardiovascular  Cardiovascular exam normal    Pulmonary  Pulmonary exam normal     Other Findings  post-pubertal.      Anesthesia Plan  ASA Score- 2     Anesthesia Type- epidural with ASA Monitors.         Additional Monitors:     Airway Plan:            Plan Factors-    Chart reviewed.   Existing labs reviewed. Patient summary reviewed.    Patient is not a current smoker.              Induction-     Postoperative Plan-     Informed Consent- Anesthetic plan and risks discussed with patient.                   No

## 2024-03-13 ENCOUNTER — LACTATION ENCOUNTER (OUTPATIENT)
Dept: NURSERY | Facility: HOSPITAL | Age: 29
End: 2024-03-13

## 2024-03-13 VITALS
HEART RATE: 70 BPM | HEIGHT: 64 IN | WEIGHT: 157.4 LBS | SYSTOLIC BLOOD PRESSURE: 122 MMHG | DIASTOLIC BLOOD PRESSURE: 80 MMHG | TEMPERATURE: 97.8 F | BODY MASS INDEX: 26.87 KG/M2 | RESPIRATION RATE: 18 BRPM

## 2024-03-13 LAB — TREPONEMA PALLIDUM IGG+IGM AB [PRESENCE] IN SERUM OR PLASMA BY IMMUNOASSAY: NORMAL

## 2024-03-13 PROCEDURE — 99024 POSTOP FOLLOW-UP VISIT: CPT | Performed by: NURSE PRACTITIONER

## 2024-03-13 PROCEDURE — NC001 PR NO CHARGE: Performed by: OBSTETRICS & GYNECOLOGY

## 2024-03-13 RX ORDER — IBUPROFEN 600 MG/1
600 TABLET ORAL EVERY 6 HOURS PRN
Start: 2024-03-13

## 2024-03-13 RX ORDER — ACETAMINOPHEN 325 MG/1
650 TABLET ORAL EVERY 4 HOURS PRN
Start: 2024-03-13

## 2024-03-13 RX ADMIN — ACETAMINOPHEN 325MG 650 MG: 325 TABLET ORAL at 16:52

## 2024-03-13 RX ADMIN — BENZOCAINE AND LEVOMENTHOL 1 APPLICATION: 200; 5 SPRAY TOPICAL at 16:52

## 2024-03-13 RX ADMIN — IBUPROFEN 600 MG: 600 TABLET ORAL at 04:27

## 2024-03-13 RX ADMIN — IBUPROFEN 600 MG: 600 TABLET ORAL at 11:45

## 2024-03-13 RX ADMIN — WITCH HAZEL 1 PAD: 500 SOLUTION RECTAL; TOPICAL at 16:52

## 2024-03-13 RX ADMIN — DOCUSATE SODIUM 100 MG: 100 CAPSULE, LIQUID FILLED ORAL at 09:45

## 2024-03-13 RX ADMIN — ACETAMINOPHEN 325MG 650 MG: 325 TABLET ORAL at 09:45

## 2024-03-13 NOTE — PROGRESS NOTES
Progress Note - OB/GYN  Post-Partum Physician Note   Taylor Cruz 29 y.o. female MRN: [id number]  Unit/Bed#:  421-01 Encounter: [id number]    Patient is post-partum day 1 from a Spontaneous vaginal delivery.     Brief OB review:   admitted for eIOL. Pregnancy uncomplicated. Uncomplicated @ 39w 4d     Pain: yes, cramping well controlled with motrin   Tolerating Oral Intake: yes  Voiding: yes  Flatus: yes  Bowel Movement: no  Ambulating: yes  Breastfeeding: Breastfeeding  Chest Pain: no  Shortness of Breath: no  Leg Pain/Discomfort: no  Lochia: minimal and moderate  Other: bonding well     Objective:   Vitals:    24 0300   BP: 109/73   Pulse: 71   Resp: 16   Temp: 97.7 °F (36.5 °C)       Intake/Output Summary (Last 24 hours) at 3/13/2024 0734  Last data filed at 3/12/2024 2000  Gross per 24 hour   Intake --   Output 2775 ml   Net -2775 ml       Physical Exam:  General: in no apparent distress and well developed and well nourished  Cardiovascular: Cor RRR  Lungs: clear to auscultation bilaterally  Abdomen: abdomen is soft without significant tenderness, masses, organomegaly or guarding  Fundus: Firm and appropriately tender to palpation, 2 below the umbilicus  Lower extremeties: nontender; no edema       Labs/Tests:   Admission H/H 10.4/ 32.4 on 3/11/24   Type and screen B+    MEDS:   Current Facility-Administered Medications   Medication Dose Route Frequency    acetaminophen (TYLENOL) tablet 650 mg  650 mg Oral Q4H PRN    benzocaine-menthol-lanolin-aloe (DERMOPLAST) 20-0.5 % topical spray 1 Application  1 Application Topical Q6H PRN    calcium carbonate (TUMS) chewable tablet 1,000 mg  1,000 mg Oral Daily PRN    docusate sodium (COLACE) capsule 100 mg  100 mg Oral BID    hydrocortisone 1 % cream 1 Application  1 Application Topical Daily PRN    ibuprofen (MOTRIN) tablet 600 mg  600 mg Oral Q6H    ondansetron (ZOFRAN) injection 4 mg  4 mg Intravenous Q8H PRN    witch hazel-glycerin (TUCKS)  topical pad 1 Pad  1 Pad Topical Q4H PRN     Invasive Devices       Peripheral Intravenous Line  Duration             Peripheral IV 24 Dorsal (posterior);Left Hand 1 day                    Assessment and Plan:  29 y.o. year-old , postpartum day 1 status-post     delivery    Planning condoms for PP contraception   Continue routine postpartum care  Encourage ambulation  Advance diet as tolerated  Lactation support   Anticipate DC today if baby cleared

## 2024-03-13 NOTE — PLAN OF CARE

## 2024-03-13 NOTE — CASE MANAGEMENT
Case Management Progress Note    Patient name Taylor Cruz  Location /-01 MRN [id number]  : 1995 Date 3/13/2024       LOS (days): 2  Geometric Mean LOS (GMLOS) (days):   Days to GMLOS:        OBJECTIVE:        Current admission status: Inpatient  Preferred Pharmacy:   [facility]/pharmacy #[id number] - [place] - [address]  [address]  [place] [address]  Phone: [phone] Fax: [phone]    Primary Care Provider: No primary care provider on file.    Primary Insurance: BLUE CROSS  Secondary Insurance:     PROGRESS NOTE:    CM received consult for Zomee Z2 breast pump. Order placed via [place], MOB in agreement with home delivery as plan is for discharge home shortly. [employer] made aware of same.

## 2024-03-13 NOTE — PROGRESS NOTES
All belongings accounted for by patient and S.O. before leaving. Discharge instructions given and reviewed, questions answered. Patient chose to walk from unit escorted by S.OErrol carrying infant secured in car seat and escorted by MELBA Davila

## 2024-03-13 NOTE — PLAN OF CARE

## 2024-03-14 LAB
DME PARACHUTE DELIVERY DATE ACTUAL: NORMAL
DME PARACHUTE DELIVERY DATE REQUESTED: NORMAL
DME PARACHUTE ITEM DESCRIPTION: NORMAL
DME PARACHUTE ORDER STATUS: NORMAL
DME PARACHUTE SUPPLIER NAME: NORMAL
DME PARACHUTE SUPPLIER PHONE: NORMAL

## 2024-03-14 NOTE — LACTATION NOTE
This note was copied from a baby's chart.  CONSULT - LACTATION  Baby Girl Cruz (Brianna) 1 days female MRN: [id number]    [facility]  Room / Bed: (N)/(N) Encounter: [id number]    Maternal Information     MOTHER:  Taylor Cruz  Maternal Age: 29 y.o.   OB History: # 1 - Date: 20, Sex: Male, Weight: 3745 g (8 lb 4.1 oz), GA: 40w3d, Delivery: Vaginal, Spontaneous, Apgar1: 8, Apgar5: 9, Living: Living, Birth Comments: None    # 2 - Date: 2023, Sex: None, Weight: None, GA: None, Delivery: None, Apgar1: None, Apgar5: None, Living: None, Birth Comments: None    # 3 - Date: 24, Sex: Female, Weight: 3730 g (8 lb 3.6 oz), GA: 39w4d, Delivery: Vaginal, Spontaneous, Apgar1: 7, Apgar5: 8, Living: Living, Birth Comments: None   Previouse breast reduction surgery? 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     Assessment: normal assessment    Feeding assessment:  not assessed, baby latching well per mom.  LATCH:  Latch:    Audible Swallowing:    Type of Nipple:    Comfort (Breast/Nipple):    Hold (Positioning):    LATCH Score:         Feeding recommendations:  breast feed on demand    Ready Set Baby and Discharge Breastfeeding Booklets were  provided and left at bedside for review. Taylor states that she is comfortable with the information contained in the Booklets and has no questions at this time.    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 She states that baby is latching on her right side well, reports normal nipple tenderness.     Her plan is to be discharged to home with her baby girl shortly. A Case Management Consult was placed for a Zomee Z2 Breast Pump, She does report having a pump from her 3y o that she can use if needed . If pump needs to be shipped.     Did review the Discharge Breastfeeding log with her ( At least 8 feedings in a 24 hour period and output goals)     Discussed s/s engorgement, blocked milk ducts, and mastitis. Discussed how to remedy at home and when to contact physician. [facility] PT procedure information for plugged ducts was also provided.    [facility] Information for follow up breastfeeding support as needed was also provided.              Linette Modi RN 3/13/2024 9:26 PM

## 2024-03-20 LAB — PLACENTA IN STORAGE: NORMAL

## 2024-04-22 ENCOUNTER — POSTPARTUM VISIT (OUTPATIENT)
Dept: OBGYN CLINIC | Facility: MEDICAL CENTER | Age: 29
End: 2024-04-22

## 2024-04-22 VITALS
HEIGHT: 64 IN | DIASTOLIC BLOOD PRESSURE: 70 MMHG | SYSTOLIC BLOOD PRESSURE: 116 MMHG | BODY MASS INDEX: 23.39 KG/M2 | WEIGHT: 137 LBS

## 2024-04-22 PROCEDURE — 99024 POSTOP FOLLOW-UP VISIT: CPT | Performed by: OBSTETRICS & GYNECOLOGY

## 2024-04-22 NOTE — PROGRESS NOTES
Post partum Visit        -3/12/24  8lb 3 oz     2.  Breast feeding - exclusive    Baby and me for lactation     3.  Post partum depression screening    Risk - 0   Support at home baby and me center    4.  Sex     5.  Contraception / future fertility - not ready for birth control yet     6.  Return to work     7.  Weight    Starting 141   Delivery 157   Total gain 16     Current -137    8.  Annual    Pap -23 neg    Next due

## 2025-04-05 ENCOUNTER — OFFICE VISIT (OUTPATIENT)
Dept: URGENT CARE | Facility: MEDICAL CENTER | Age: 30
End: 2025-04-05
Payer: COMMERCIAL

## 2025-04-05 VITALS
RESPIRATION RATE: 18 BRPM | OXYGEN SATURATION: 99 % | TEMPERATURE: 98.3 F | HEART RATE: 96 BPM | SYSTOLIC BLOOD PRESSURE: 133 MMHG | DIASTOLIC BLOOD PRESSURE: 85 MMHG

## 2025-04-05 DIAGNOSIS — J02.0 STREP PHARYNGITIS: Primary | ICD-10-CM

## 2025-04-05 LAB — S PYO AG THROAT QL: NEGATIVE

## 2025-04-05 PROCEDURE — 87147 CULTURE TYPE IMMUNOLOGIC: CPT | Performed by: NURSE PRACTITIONER

## 2025-04-05 PROCEDURE — 87880 STREP A ASSAY W/OPTIC: CPT | Performed by: NURSE PRACTITIONER

## 2025-04-05 PROCEDURE — G0382 LEV 3 HOSP TYPE B ED VISIT: HCPCS | Performed by: NURSE PRACTITIONER

## 2025-04-05 PROCEDURE — 87070 CULTURE OTHR SPECIMN AEROBIC: CPT | Performed by: NURSE PRACTITIONER

## 2025-04-05 PROCEDURE — S9083 URGENT CARE CENTER GLOBAL: HCPCS | Performed by: NURSE PRACTITIONER

## 2025-04-05 RX ORDER — CEPHALEXIN 500 MG/1
500 CAPSULE ORAL EVERY 12 HOURS SCHEDULED
Qty: 20 CAPSULE | Refills: 0 | Status: SHIPPED | OUTPATIENT
Start: 2025-04-05 | End: 2025-04-15

## 2025-04-05 RX ORDER — CEPHALEXIN 500 MG/1
500 CAPSULE ORAL EVERY 12 HOURS SCHEDULED
Qty: 14 CAPSULE | Refills: 0 | Status: SHIPPED | OUTPATIENT
Start: 2025-04-05 | End: 2025-04-05

## 2025-04-05 NOTE — PROGRESS NOTES
[facility]        NAME: Taylor Cruz is a 30 y.o. female  : 1995    MRN: [id number]  DATE: 2025  TIME: 12:04 PM    Assessment and Plan   Strep pharyngitis [J02.0]  1. Strep pharyngitis  POCT rapid ANTIGEN strepA    Throat culture    Throat culture    cephalexin (KEFLEX) 500 mg capsule    DISCONTINUED: cephalexin (KEFLEX) 500 mg capsule        Patient in NAD and VSS upon exam. Discussed with patient negative strep in office, however given exposure to son and exam presentation will start abx at this time. Centor score 2-3. Discussed supportive care and return precautions. Patient/Parent agreeable to plan of care and all questions answered. Note for work/school given as needed.      Patient Instructions       Follow up with PCP in 3-5 days.  Proceed to  ER if symptoms worsen.    If tests have been performed at [facility], our office will contact you with results if changes need to be made to the care plan discussed with you at the visit.  You can review your full results on [facility] MyChart.    Chief Complaint     Chief Complaint   Patient presents with   • Sore Throat     Patient c/o sore throat x 3-4 days She noted that her son just tested positive for strep this morning.          History of Present Illness       Started: 3-4 days   Positive: ST, subjective fever on Wednesday, congestion, cough  Reports having body aches, fatigue, HA initially but have since resolved  Denies CP, SOB, trouble breathing  Treatment: dayquil, nyquil  Son tested positive for strep this morning        Review of Systems   Review of Systems   Constitutional:  Positive for fatigue and fever.   HENT:  Positive for congestion and sore throat.    Respiratory:  Positive for cough.    Musculoskeletal:  Positive for myalgias.   Neurological:  Positive for headaches.   All other systems reviewed and are negative.        Current Medications       Current Outpatient Medications:   •  cephalexin (KEFLEX) 500 mg  capsule, Take 1 capsule (500 mg total) by mouth every 12 (twelve) hours for 10 days, Disp: 20 capsule, Rfl: 0  •  acetaminophen (TYLENOL) 325 mg tablet, Take 2 tablets (650 mg total) by mouth every 4 (four) hours as needed for mild pain (Patient not taking: Reported on 4/22/2024), Disp: , Rfl:   •  benzocaine-menthol-lanolin-aloe (DERMOPLAST) 20-0.5 % topical spray, Apply 1 Application topically every 6 (six) hours as needed for mild pain or irritation (Patient not taking: Reported on 4/22/2024), Disp: , Rfl:   •  ibuprofen (MOTRIN) 600 mg tablet, Take 1 tablet (600 mg total) by mouth every 6 (six) hours as needed for mild pain (Patient not taking: Reported on 4/22/2024), Disp: , Rfl:   •  Prenatal Vit-Fe Fumarate-FA (Prenatal 19) tablet, Chew 1 tablet daily, Disp: , Rfl:     Current Allergies     Allergies as of 04/05/2025 - Reviewed 04/05/2025   Allergen Reaction Noted   • Penicillins Hives 07/06/2015            The following portions of the patient's history were reviewed and updated as appropriate: allergies, current medications, past family history, past medical history, past social history, past surgical history and problem list.     Past Medical History:   Diagnosis Date   • Migraine    • Urinary tract infection     frequent UTI in past       Past Surgical History:   Procedure Laterality Date   • BREAST SURGERY      breast augemtation   • TOE SURGERY         Family History   Problem Relation Age of Onset   • Migraines Mother    • Coronary artery disease Father    • No Known Problems Brother    • No Known Problems Son    • No Known Problems Maternal Grandmother    • Diabetes Maternal Grandfather    • Coronary artery disease Paternal Grandmother    • Heart disease Paternal Grandfather    • Breast cancer Neg Hx    • Colon cancer Neg Hx    • Ovarian cancer Neg Hx          Medications have been verified.        Objective   /85   Pulse 96   Temp 98.3 °F (36.8 °C)   Resp 18   LMP 04/03/2025   SpO2 99%    Breastfeeding No   Patient's last menstrual period was 04/03/2025.       Physical Exam     Physical Exam  Constitutional:       General: She is not in acute distress.     Appearance: Normal appearance. She is not ill-appearing.   HENT:      Head: Normocephalic and atraumatic.      Right Ear: Hearing, tympanic membrane, ear canal and external ear normal.      Left Ear: Hearing, tympanic membrane, ear canal and external ear normal.      Nose: No congestion.      Right Sinus: No maxillary sinus tenderness or frontal sinus tenderness.      Left Sinus: No maxillary sinus tenderness or frontal sinus tenderness.      Mouth/Throat:      Lips: Pink.      Mouth: Mucous membranes are moist.      Pharynx: Pharyngeal swelling, oropharyngeal exudate and posterior oropharyngeal erythema present.   Cardiovascular:      Rate and Rhythm: Normal rate and regular rhythm.   Pulmonary:      Effort: Pulmonary effort is normal.      Breath sounds: Normal breath sounds.      Comments: No cough on exam  Musculoskeletal:         General: Normal range of motion.   Lymphadenopathy:      Cervical: Cervical adenopathy present.   Skin:     General: Skin is warm and dry.   Neurological:      Mental Status: She is alert and oriented to person, place, and time.

## 2025-04-05 NOTE — PATIENT INSTRUCTIONS
Antibiotics as directed  Ibuprofen 400mg every 6-8 hours as needed for pain  Increase hydration  You are contagious until you are on antibiotics for 24 hours  Change your toothbrush and toothpaste after 24 hours on antibiotics  Change these again when you are done with antibiotics to help prevent reinfection  Wipe down all high touch areas at home ie door hands, light switches, remote controls etc  Follow up with PCP if not improving

## 2025-04-07 LAB — BACTERIA THROAT CULT: ABNORMAL
